# Patient Record
Sex: FEMALE | Race: WHITE | Employment: UNEMPLOYED | URBAN - METROPOLITAN AREA
[De-identification: names, ages, dates, MRNs, and addresses within clinical notes are randomized per-mention and may not be internally consistent; named-entity substitution may affect disease eponyms.]

---

## 2020-01-11 ENCOUNTER — OFFICE VISIT (OUTPATIENT)
Dept: FAMILY MEDICINE CLINIC | Age: 60
End: 2020-01-11

## 2020-01-11 VITALS
HEART RATE: 103 BPM | BODY MASS INDEX: 30.53 KG/M2 | SYSTOLIC BLOOD PRESSURE: 142 MMHG | RESPIRATION RATE: 18 BRPM | WEIGHT: 190 LBS | DIASTOLIC BLOOD PRESSURE: 92 MMHG | HEIGHT: 66 IN | OXYGEN SATURATION: 94 % | TEMPERATURE: 100.3 F

## 2020-01-11 PROCEDURE — 99202 OFFICE O/P NEW SF 15 MIN: CPT | Performed by: NURSE PRACTITIONER

## 2020-01-11 RX ORDER — PREDNISONE 10 MG/1
TABLET ORAL
Qty: 30 TABLET | Refills: 0 | Status: SHIPPED | OUTPATIENT
Start: 2020-01-11

## 2020-01-11 ASSESSMENT — ENCOUNTER SYMPTOMS
RESPIRATORY NEGATIVE: 1
WHEEZING: 0
CHEST TIGHTNESS: 0
COUGH: 0
SHORTNESS OF BREATH: 0

## 2020-01-11 NOTE — PROGRESS NOTES
Cardiovascular: Negative. Negative for chest pain. Musculoskeletal: Positive for myalgias. Skin: Positive for rash. Neurological: Negative for dizziness, weakness, light-headedness and headaches. Hematological: Negative for adenopathy. All other systems reviewed and are negative. Objective:      Physical Exam  Vitals signs and nursing note reviewed. Constitutional:       General: She is not in acute distress. Appearance: Normal appearance. She is well-developed. She is not ill-appearing, toxic-appearing or diaphoretic. HENT:      Head: Normocephalic. Right Ear: Tympanic membrane and external ear normal.      Left Ear: Tympanic membrane and external ear normal.      Nose: Nose normal.      Right Sinus: No maxillary sinus tenderness or frontal sinus tenderness. Left Sinus: No maxillary sinus tenderness or frontal sinus tenderness. Mouth/Throat:      Pharynx: No oropharyngeal exudate or posterior oropharyngeal erythema. Eyes:      General:         Right eye: No discharge. Left eye: No discharge. Cardiovascular:      Rate and Rhythm: Normal rate and regular rhythm. Heart sounds: Normal heart sounds. No murmur. Pulmonary:      Effort: Pulmonary effort is normal. No respiratory distress. Breath sounds: Normal breath sounds. No wheezing or rales. Lymphadenopathy:      Cervical: No cervical adenopathy. Skin:     General: Skin is warm. Findings: Rash present. Rash is urticarial.   Neurological:      Mental Status: She is alert. BP (!) 142/92   Pulse 103   Temp 100.3 °F (37.9 °C)   Resp 18   Ht 5' 6\" (1.676 m)   Wt 190 lb (86.2 kg)   SpO2 94%   BMI 30.67 kg/m²     Assessment:       Diagnosis Orders   1. Urticarial rash  predniSONE (DELTASONE) 10 MG tablet   2. Viral URI             Based on the reported symptoms and the appearance of the rash-- I believe this is urticarial at this time.   Prednisone sent to the pharmacy to help enable symptom relief. Once daily Claritin recommended. Benadryl as needed for the itching/histamine response. Patient agrees with treatment plan. Educational materials provided on AVS.  Follow up if symptoms do not improve/worsen. Orders Placed This Encounter   Medications    predniSONE (DELTASONE) 10 MG tablet     Sig: Take 4 tabs daily  x 3 days, 3 tabs daily x 3 days, 2 tabs daily x 3 days, and 1 tab daily x 3 days. Dispense:  30 tablet     Refill:  0        Patient given educational materials - see patient instructions. Discussed use, benefit, and side effects of prescribed medications. All patientquestions answered. Pt voiced understanding.     Electronically signed by SHELLY Macias CNP on 1/15/2020at 8:51 AM

## 2020-01-11 NOTE — PATIENT INSTRUCTIONS
lightheaded or suddenly feel weak, confused, or restless.    Call your doctor now or seek immediate medical care if:    · You have symptoms of an allergic reaction, such as:  ? A rash or hives (raised, red areas on the skin). ? Itching. ? Swelling. ? Belly pain, nausea, or vomiting.     · You get hives after you start a new medicine.     · Hives have not gone away after 24 hours.    Watch closely for changes in your health, and be sure to contact your doctor if:    · You do not get better as expected. Where can you learn more? Go to https://[email or web address].[email or web address]. org and sign in to your [facility] account. Enter Z935 in the [employer].com box to learn more about \"Hives: Care Instructions. \"     If you do not have an account, please click on the \"Sign Up Now\" link. Current as of: June 26, 2019  Content Version: 12.3  © 9873-7182 Healthwise, Incorporated. Care instructions adapted under license by [facility] ([facility]). If you have questions about a medical condition or this instruction, always ask your healthcare professional. [place] [address] any warranty or liability for your use of this information.

## 2020-01-15 ASSESSMENT — ENCOUNTER SYMPTOMS
VOICE CHANGE: 0
EYE DISCHARGE: 0
SORE THROAT: 0
EYE REDNESS: 0
SINUS PRESSURE: 0

## 2024-02-09 ENCOUNTER — APPOINTMENT (OUTPATIENT)
Dept: CT IMAGING | Age: 64
DRG: 442 | End: 2024-02-09
Payer: COMMERCIAL

## 2024-02-09 ENCOUNTER — HOSPITAL ENCOUNTER (INPATIENT)
Age: 64
LOS: 4 days | Discharge: HOME HEALTH | DRG: 442 | End: 2024-02-13
Attending: EMERGENCY MEDICINE | Admitting: INTERNAL MEDICINE
Payer: COMMERCIAL

## 2024-02-09 ENCOUNTER — APPOINTMENT (OUTPATIENT)
Dept: GENERAL RADIOLOGY | Age: 64
DRG: 442 | End: 2024-02-09
Payer: COMMERCIAL

## 2024-02-09 DIAGNOSIS — J90 PLEURAL EFFUSION: ICD-10-CM

## 2024-02-09 DIAGNOSIS — K76.9 LESION OF LIVER GREATER THAN 1 CM IN DIAMETER WITH NO LIVER DISEASE OR HISTORY OF MALIGNANT NEOPLASM: Primary | ICD-10-CM

## 2024-02-09 DIAGNOSIS — K75.0 LIVER ABSCESS: ICD-10-CM

## 2024-02-09 DIAGNOSIS — R59.0 HILAR LYMPHADENOPATHY: ICD-10-CM

## 2024-02-09 PROBLEM — D72.9 NEUTROPHILIA: Status: ACTIVE | Noted: 2024-02-09

## 2024-02-09 PROBLEM — D72.828 NEUTROPHILIA: Status: ACTIVE | Noted: 2024-02-09

## 2024-02-09 PROBLEM — R59.1 LYMPHADENOPATHY: Status: ACTIVE | Noted: 2024-02-09

## 2024-02-09 LAB
ALBUMIN SERPL-MCNC: 4.1 G/DL (ref 3.5–5.2)
ALP SERPL-CCNC: 119 U/L (ref 35–104)
ALT SERPL-CCNC: 16 U/L (ref 5–33)
ANION GAP SERPL CALCULATED.3IONS-SCNC: 13 MMOL/L (ref 9–17)
AST SERPL-CCNC: 16 U/L
BASOPHILS # BLD: 0.08 K/UL (ref 0–0.2)
BASOPHILS NFR BLD: 1 % (ref 0–2)
BILIRUB DIRECT SERPL-MCNC: 0.1 MG/DL
BILIRUB INDIRECT SERPL-MCNC: 0.3 MG/DL (ref 0–1)
BILIRUB SERPL-MCNC: 0.4 MG/DL (ref 0.3–1.2)
BUN SERPL-MCNC: 8 MG/DL (ref 8–23)
BUN/CREAT SERPL: 11 (ref 9–20)
CALCIUM SERPL-MCNC: 9.7 MG/DL (ref 8.6–10.4)
CHLORIDE SERPL-SCNC: 94 MMOL/L (ref 98–107)
CO2 SERPL-SCNC: 27 MMOL/L (ref 20–31)
CREAT SERPL-MCNC: 0.7 MG/DL (ref 0.5–0.9)
D DIMER PPP FEU-MCNC: 3.66 UG/ML FEU (ref 0–0.59)
EKG ATRIAL RATE: 85 BPM
EKG P AXIS: 41 DEGREES
EKG P-R INTERVAL: 120 MS
EKG Q-T INTERVAL: 368 MS
EKG QRS DURATION: 82 MS
EKG QTC CALCULATION (BAZETT): 437 MS
EKG R AXIS: 0 DEGREES
EKG T AXIS: 52 DEGREES
EKG VENTRICULAR RATE: 85 BPM
EOSINOPHIL # BLD: 0.05 K/UL (ref 0–0.44)
EOSINOPHILS RELATIVE PERCENT: 0 % (ref 1–4)
ERYTHROCYTE [DISTWIDTH] IN BLOOD BY AUTOMATED COUNT: 12 % (ref 11.8–14.4)
GFR SERPL CREATININE-BSD FRML MDRD: >60 ML/MIN/1.73M2
GLUCOSE SERPL-MCNC: 90 MG/DL (ref 70–99)
HCT VFR BLD AUTO: 41.7 % (ref 36.3–47.1)
HGB BLD-MCNC: 13.6 G/DL (ref 11.9–15.1)
IMM GRANULOCYTES # BLD AUTO: 0.04 K/UL (ref 0–0.3)
IMM GRANULOCYTES NFR BLD: 0 %
INR PPP: 1.2
LIPASE SERPL-CCNC: 31 U/L (ref 13–60)
LYMPHOCYTES NFR BLD: 1.37 K/UL (ref 1.1–3.7)
LYMPHOCYTES RELATIVE PERCENT: 10 % (ref 24–43)
MCH RBC QN AUTO: 30.9 PG (ref 25.2–33.5)
MCHC RBC AUTO-ENTMCNC: 32.6 G/DL (ref 28.4–34.8)
MCV RBC AUTO: 94.8 FL (ref 82.6–102.9)
MONOCYTES NFR BLD: 0.81 K/UL (ref 0.1–1.2)
MONOCYTES NFR BLD: 6 % (ref 3–12)
NEUTROPHILS NFR BLD: 83 % (ref 36–65)
NEUTS SEG NFR BLD: 12.08 K/UL (ref 1.5–8.1)
NRBC BLD-RTO: 0 PER 100 WBC
PLATELET # BLD AUTO: 527 K/UL (ref 138–453)
PMV BLD AUTO: 9.9 FL (ref 8.1–13.5)
POTASSIUM SERPL-SCNC: 4.4 MMOL/L (ref 3.7–5.3)
PROT SERPL-MCNC: 8.8 G/DL (ref 6.4–8.3)
PROTHROMBIN TIME: 14.8 SEC (ref 11.5–14.2)
RBC # BLD AUTO: 4.4 M/UL (ref 3.95–5.11)
SODIUM SERPL-SCNC: 134 MMOL/L (ref 135–144)
TROPONIN I SERPL HS-MCNC: 8 NG/L (ref 0–14)
WBC OTHER # BLD: 14.4 K/UL (ref 3.5–11.3)

## 2024-02-09 PROCEDURE — 1200000000 HC SEMI PRIVATE

## 2024-02-09 PROCEDURE — 96375 TX/PRO/DX INJ NEW DRUG ADDON: CPT

## 2024-02-09 PROCEDURE — 71260 CT THORAX DX C+: CPT

## 2024-02-09 PROCEDURE — 6360000004 HC RX CONTRAST MEDICATION

## 2024-02-09 PROCEDURE — 85610 PROTHROMBIN TIME: CPT

## 2024-02-09 PROCEDURE — 99255 IP/OBS CONSLTJ NEW/EST HI 80: CPT | Performed by: INTERNAL MEDICINE

## 2024-02-09 PROCEDURE — 84484 ASSAY OF TROPONIN QUANT: CPT

## 2024-02-09 PROCEDURE — 83690 ASSAY OF LIPASE: CPT

## 2024-02-09 PROCEDURE — 2580000003 HC RX 258

## 2024-02-09 PROCEDURE — 96374 THER/PROPH/DIAG INJ IV PUSH: CPT

## 2024-02-09 PROCEDURE — 80076 HEPATIC FUNCTION PANEL: CPT

## 2024-02-09 PROCEDURE — 93005 ELECTROCARDIOGRAM TRACING: CPT

## 2024-02-09 PROCEDURE — 2580000003 HC RX 258: Performed by: INTERNAL MEDICINE

## 2024-02-09 PROCEDURE — 99222 1ST HOSP IP/OBS MODERATE 55: CPT | Performed by: INTERNAL MEDICINE

## 2024-02-09 PROCEDURE — 6370000000 HC RX 637 (ALT 250 FOR IP): Performed by: NURSE PRACTITIONER

## 2024-02-09 PROCEDURE — 85025 COMPLETE CBC W/AUTO DIFF WBC: CPT

## 2024-02-09 PROCEDURE — 71101 X-RAY EXAM UNILAT RIBS/CHEST: CPT

## 2024-02-09 PROCEDURE — 99285 EMERGENCY DEPT VISIT HI MDM: CPT

## 2024-02-09 PROCEDURE — 96361 HYDRATE IV INFUSION ADD-ON: CPT

## 2024-02-09 PROCEDURE — 2580000003 HC RX 258: Performed by: NURSE PRACTITIONER

## 2024-02-09 PROCEDURE — 6360000002 HC RX W HCPCS: Performed by: INTERNAL MEDICINE

## 2024-02-09 PROCEDURE — 87040 BLOOD CULTURE FOR BACTERIA: CPT

## 2024-02-09 PROCEDURE — 73030 X-RAY EXAM OF SHOULDER: CPT

## 2024-02-09 PROCEDURE — 80048 BASIC METABOLIC PNL TOTAL CA: CPT

## 2024-02-09 PROCEDURE — 93010 ELECTROCARDIOGRAM REPORT: CPT | Performed by: INTERNAL MEDICINE

## 2024-02-09 PROCEDURE — 85379 FIBRIN DEGRADATION QUANT: CPT

## 2024-02-09 PROCEDURE — 6360000002 HC RX W HCPCS

## 2024-02-09 RX ORDER — MORPHINE SULFATE 4 MG/ML
4 INJECTION, SOLUTION INTRAMUSCULAR; INTRAVENOUS ONCE
Status: COMPLETED | OUTPATIENT
Start: 2024-02-09 | End: 2024-02-09

## 2024-02-09 RX ORDER — ONDANSETRON 4 MG/1
4 TABLET, ORALLY DISINTEGRATING ORAL EVERY 8 HOURS PRN
Status: DISCONTINUED | OUTPATIENT
Start: 2024-02-09 | End: 2024-02-13 | Stop reason: HOSPADM

## 2024-02-09 RX ORDER — ACETAMINOPHEN 325 MG/1
650 TABLET ORAL EVERY 6 HOURS PRN
Status: DISCONTINUED | OUTPATIENT
Start: 2024-02-09 | End: 2024-02-13 | Stop reason: HOSPADM

## 2024-02-09 RX ORDER — SODIUM CHLORIDE 0.9 % (FLUSH) 0.9 %
5-40 SYRINGE (ML) INJECTION EVERY 12 HOURS SCHEDULED
Status: DISCONTINUED | OUTPATIENT
Start: 2024-02-09 | End: 2024-02-13 | Stop reason: HOSPADM

## 2024-02-09 RX ORDER — SODIUM CHLORIDE 0.9 % (FLUSH) 0.9 %
10 SYRINGE (ML) INJECTION PRN
Status: DISCONTINUED | OUTPATIENT
Start: 2024-02-09 | End: 2024-02-13 | Stop reason: HOSPADM

## 2024-02-09 RX ORDER — MAGNESIUM SULFATE 1 G/100ML
1000 INJECTION INTRAVENOUS PRN
Status: DISCONTINUED | OUTPATIENT
Start: 2024-02-09 | End: 2024-02-13 | Stop reason: HOSPADM

## 2024-02-09 RX ORDER — 0.9 % SODIUM CHLORIDE 0.9 %
100 INTRAVENOUS SOLUTION INTRAVENOUS ONCE
Status: DISCONTINUED | OUTPATIENT
Start: 2024-02-09 | End: 2024-02-13 | Stop reason: HOSPADM

## 2024-02-09 RX ORDER — POLYETHYLENE GLYCOL 3350 17 G/17G
17 POWDER, FOR SOLUTION ORAL DAILY PRN
Status: DISCONTINUED | OUTPATIENT
Start: 2024-02-09 | End: 2024-02-13 | Stop reason: HOSPADM

## 2024-02-09 RX ORDER — SODIUM CHLORIDE 9 MG/ML
INJECTION, SOLUTION INTRAVENOUS PRN
Status: DISCONTINUED | OUTPATIENT
Start: 2024-02-09 | End: 2024-02-13 | Stop reason: HOSPADM

## 2024-02-09 RX ORDER — METRONIDAZOLE 500 MG/100ML
500 INJECTION, SOLUTION INTRAVENOUS EVERY 8 HOURS
Status: DISCONTINUED | OUTPATIENT
Start: 2024-02-09 | End: 2024-02-10

## 2024-02-09 RX ORDER — ONDANSETRON 2 MG/ML
4 INJECTION INTRAMUSCULAR; INTRAVENOUS EVERY 6 HOURS PRN
Status: DISCONTINUED | OUTPATIENT
Start: 2024-02-09 | End: 2024-02-13 | Stop reason: HOSPADM

## 2024-02-09 RX ORDER — 0.9 % SODIUM CHLORIDE 0.9 %
1000 INTRAVENOUS SOLUTION INTRAVENOUS ONCE
Status: COMPLETED | OUTPATIENT
Start: 2024-02-09 | End: 2024-02-09

## 2024-02-09 RX ORDER — ENOXAPARIN SODIUM 100 MG/ML
40 INJECTION SUBCUTANEOUS DAILY
Status: DISCONTINUED | OUTPATIENT
Start: 2024-02-10 | End: 2024-02-13 | Stop reason: HOSPADM

## 2024-02-09 RX ORDER — ACETAMINOPHEN 650 MG/1
650 SUPPOSITORY RECTAL EVERY 6 HOURS PRN
Status: DISCONTINUED | OUTPATIENT
Start: 2024-02-09 | End: 2024-02-13 | Stop reason: HOSPADM

## 2024-02-09 RX ORDER — POTASSIUM CHLORIDE 20 MEQ/1
40 TABLET, EXTENDED RELEASE ORAL PRN
Status: DISCONTINUED | OUTPATIENT
Start: 2024-02-09 | End: 2024-02-13 | Stop reason: HOSPADM

## 2024-02-09 RX ORDER — KETOROLAC TROMETHAMINE 15 MG/ML
15 INJECTION, SOLUTION INTRAMUSCULAR; INTRAVENOUS ONCE
Status: COMPLETED | OUTPATIENT
Start: 2024-02-09 | End: 2024-02-09

## 2024-02-09 RX ORDER — HYDROCODONE BITARTRATE AND ACETAMINOPHEN 5; 325 MG/1; MG/1
1 TABLET ORAL EVERY 6 HOURS PRN
Status: DISCONTINUED | OUTPATIENT
Start: 2024-02-09 | End: 2024-02-10

## 2024-02-09 RX ORDER — POTASSIUM CHLORIDE 7.45 MG/ML
10 INJECTION INTRAVENOUS PRN
Status: DISCONTINUED | OUTPATIENT
Start: 2024-02-09 | End: 2024-02-13 | Stop reason: HOSPADM

## 2024-02-09 RX ADMIN — SODIUM CHLORIDE 1000 ML: 9 INJECTION, SOLUTION INTRAVENOUS at 10:59

## 2024-02-09 RX ADMIN — PIPERACILLIN AND TAZOBACTAM 3375 MG: 3; .375 INJECTION, POWDER, FOR SOLUTION INTRAVENOUS at 21:26

## 2024-02-09 RX ADMIN — PIPERACILLIN AND TAZOBACTAM 4500 MG: 4; .5 INJECTION, POWDER, FOR SOLUTION INTRAVENOUS at 14:27

## 2024-02-09 RX ADMIN — METRONIDAZOLE 500 MG: 500 INJECTION, SOLUTION INTRAVENOUS at 20:01

## 2024-02-09 RX ADMIN — Medication 80 ML: at 11:45

## 2024-02-09 RX ADMIN — SODIUM CHLORIDE, PRESERVATIVE FREE 10 ML: 5 INJECTION INTRAVENOUS at 11:44

## 2024-02-09 RX ADMIN — HYDROCODONE BITARTRATE AND ACETAMINOPHEN 1 TABLET: 5; 325 TABLET ORAL at 21:29

## 2024-02-09 RX ADMIN — MORPHINE SULFATE 4 MG: 4 INJECTION, SOLUTION INTRAMUSCULAR; INTRAVENOUS at 14:23

## 2024-02-09 RX ADMIN — SODIUM CHLORIDE, PRESERVATIVE FREE 10 ML: 5 INJECTION INTRAVENOUS at 19:59

## 2024-02-09 RX ADMIN — IOPAMIDOL 75 ML: 755 INJECTION, SOLUTION INTRAVENOUS at 11:44

## 2024-02-09 RX ADMIN — KETOROLAC TROMETHAMINE 15 MG: 15 INJECTION, SOLUTION INTRAMUSCULAR; INTRAVENOUS at 11:00

## 2024-02-09 NOTE — CARE COORDINATION
Case Management Assessment  Initial Evaluation    Date/Time of Evaluation: 2/9/2024 3:56 PM  Assessment Completed by: HELENA Pardo    If patient is discharged prior to next notation, then this note serves as note for discharge by case management.    Patient Name: Amanda Raphael                   YOB: 1960  Diagnosis: Lesion of right lobe of liver [K76.9]                   Date / Time: 2/9/2024 10:11 AM    Patient Admission Status: Inpatient   Readmission Risk (Low < 19, Mod (19-27), High > 27): No data recorded  Current PCP: No primary care provider on file.  PCP verified by CM? No    Chart Reviewed: Yes      History Provided by: Patient  Patient Orientation: Alert and Oriented    Patient Cognition: Alert    Hospitalization in the last 30 days (Readmission):  No    If yes, Readmission Assessment in  Navigator will be completed.    Advance Directives:      Code Status: Not on file   Patient's Primary Decision Maker is: Legal Next of Kin      Discharge Planning:    Patient lives with: Alone Type of Home: House  Primary Care Giver: Self  Patient Support Systems include: Family Members   Current Financial resources: Other (Comment) (commercial)  Current community resources: None  Current services prior to admission: None            Current DME:              Type of Home Care services:  None    ADLS  Prior functional level: Independent in ADLs/IADLs  Current functional level: Independent in ADLs/IADLs    PT AM-PAC:   /24  OT AM-PAC:   /24    Family can provide assistance at DC: No  Would you like Case Management to discuss the discharge plan with any other family members/significant others, and if so, who? No  Plans to Return to Present Housing: Yes  Other Identified Issues/Barriers to RETURNING to current housing: liver lesion  Potential Assistance needed at discharge: N/A            Potential DME:    Patient expects to discharge to: House  Plan for transportation at discharge:

## 2024-02-09 NOTE — ED NOTES
ED to inpatient nurses report     Chief Complaint   Patient presents with    Rib Pain (injury)     Right side, denies any injury, cough for last 2 wks     Shoulder Injury     States she dislocated and relocated per self no injury       Present to ED from HOME  LOC: alert and orientated to name, place, date  Vital signs   Vitals:    02/09/24 1121 02/09/24 1129 02/09/24 1136 02/09/24 1315   BP:       Pulse: 85 84 84 89   Resp: 17 20 20 20   Temp:       SpO2: 94% 93% 93% 95%   Weight:       Height:          Oxygen Baseline NA    Current needs required NA   SEPSIS: NA    LDAs:   Peripheral IV 02/09/24 Left Antecubital (Active)     Mobility: Independent  Fall Risk:  NA  Pending ED orders: NA  Present condition: STABLE  Code Status: FULL  Consults: IP CONSULT TO ONCOLOGY  IP CONSULT TO HOSPITALIST  []  Hospitalist  Completed  [] yes [] no Who:   []  Medicine  Completed  [] yes [] No Who:   []  Cardiology  Completed  [] yes [] No Who:   []  GI   Completed  [] yes [] No Who:   []  Neurology  Completed  [] yes [] No Who:   []  Nephrology Completed  [] yes [] No Who:    []  Vascular  Completed  [] yes [] No Who:   []  Ortho  Completed  [] yes [] No Who:     []  Surgery  Completed  [] yes [] No Who:    []  Urology  Completed  [] yes [] No Who:    []  CT Surgery Completed  [] yes [] No Who:   []  Podiatry  Completed  [] yes [] No Who:    []  Other    Completed  [] yes [] No Who:  Interventions: Right chest pain, cough for 1.5-2 weeks.  Important Events: Hepatic lesion noted on imaging. Pt started on IV antibiotics.         Electronically signed by SHELLY Juarez CNP on 2/9/2024 at 2:05 PM

## 2024-02-09 NOTE — PROGRESS NOTES
Patient arrived to room from ED.   Patient made comfortable, call light within reach.   Oriented to the room. Bed mechanics in good repair,  Side rails up x2.

## 2024-02-09 NOTE — ED PROVIDER NOTES
eMERGENCY dEPARTMENT eNCOUnter   Independent Attestation     Pt Name: Amanda Raphael  MRN: [id number]  Birthdate 1960  Date of evaluation: 2/9/24     Amanda Raphael is a 63 y.o. female with CC: Rib Pain (injury) (Right side, denies any injury, cough for last 2 wks ) and Shoulder Injury (States she dislocated and relocated per self no injury )      Based on the medical record the care appears appropriate.  I was personally available for consultation in the Emergency Department.    Ubaldo Munoz DO  Attending Emergency Physician                  Ubaldo Munoz DO  02/09/24 5411    
lesion measuring up  to 7.3 cm which raises concern for the possibility of malignancy or  metastatic disease.  Infection is also a differential consideration depending  upon the clinical setting.  2. Small right-sided effusion.  Enlarged subcarinal and right hilar lymph  nodes/lymphadenopathy.  3. No clear evidence for central pulmonary embolus within the limitations of  this study.  4. Bibasilar atelectasis and respiratory motion. [AJ]   1400 Spoke with Dr. Hernández with oncology who will follow patient in the hospital. Will consult intermed for admission, broad spectrum antibiotics ordered for infection versus metastatic process. [AJ]   1416 I spoke with MYNOR Dacosta with [facility] who accepts the patient for admission. [AJ]      ED Course User Index  [AJ] Bárbara Em APRN - CNP       CONSULTS:  IP CONSULT TO ONCOLOGY  IP CONSULT TO HOSPITALIST        FINAL IMPRESSION      1. Lesion of liver greater than 1 cm in diameter with no liver disease or history of malignant neoplasm    2. Pleural effusion    3. Hilar lymphadenopathy            Problem List  There is no problem list on file for this patient.        DISPOSITION/PLAN   DISPOSITION Decision To Admit 02/09/2024 02:00:02 PM      PATIENT REFERRED TO:   No follow-up provider specified.    DISCHARGE MEDICATIONS:     New Prescriptions    No medications on file           (Please note that portions of this note were completed with a voice recognition program.  Efforts were made to edit the dictations but occasionally words are mis-transcribed.)    SHELLY Tellez CNP, Audrey, APRN - CNP  02/09/24 3865

## 2024-02-09 NOTE — H&P
104 U/L    ALT 16 5 - 33 U/L    AST 16 <32 U/L    Total Bilirubin 0.4 0.3 - 1.2 mg/dL    Bilirubin, Direct 0.1 <0.3 mg/dL    Bilirubin, Indirect 0.3 0.0 - 1.0 mg/dL    Total Protein 8.8 (H) 6.4 - 8.3 g/dL   Protime-INR    Collection Time: 02/09/24 10:52 AM   Result Value Ref Range    Protime 14.8 (H) 11.5 - 14.2 sec    INR 1.2    Lipase    Collection Time: 02/09/24 10:52 AM   Result Value Ref Range    Lipase 31 13 - 60 U/L   EKG 12 Lead    Collection Time: 02/09/24 10:54 AM   Result Value Ref Range    Ventricular Rate 85 BPM    Atrial Rate 85 BPM    P-R Interval 120 ms    QRS Duration 82 ms    Q-T Interval 368 ms    QTc Calculation (Bazett) 437 ms    P Axis 41 degrees    R Axis 0 degrees    T Axis 52 degrees   Blood Culture 1    Collection Time: 02/09/24  2:10 PM    Specimen: Blood   Result Value Ref Range    Specimen Description .BLOOD     Special Requests RAC 10ML     Culture NO GROWTH <24 HRS    Culture, Blood 2    Collection Time: 02/09/24  2:14 PM    Specimen: Blood   Result Value Ref Range    Specimen Description .BLOOD     Special Requests LAC 10ML     Culture NO GROWTH <24 HRS        Imaging/Diagnostics:  CT CHEST PULMONARY EMBOLISM W CONTRAST    Result Date: 2/9/2024  1. Large indeterminate poorly defined right hepatic lobe lesion measuring up to 7.3 cm which raises concern for the possibility of malignancy or metastatic disease.  Infection is also a differential consideration depending upon the clinical setting. 2. Small right-sided effusion.  Enlarged subcarinal and right hilar lymph nodes/lymphadenopathy. 3. No clear evidence for central pulmonary embolus within the limitations of this study. 4. Bibasilar atelectasis and respiratory motion.     XR RIBS RIGHT INCLUDE CHEST (MIN 3 VIEWS)    Result Date: 2/9/2024  Right shoulder: 1. Findings which can be seen with subacromial impingement.  Mild degenerative change of the right AC joint. 2. No acute fracture or dislocation. Chest/right-sided ribs: 1. Mild

## 2024-02-09 NOTE — ED NOTES
Patient is not currently connected to a PCP. Provided patient with a list and encouraged use of list to follow up and engage in routine health care visits.

## 2024-02-10 ENCOUNTER — APPOINTMENT (OUTPATIENT)
Dept: CT IMAGING | Age: 64
DRG: 442 | End: 2024-02-10
Payer: COMMERCIAL

## 2024-02-10 PROBLEM — K76.9 LESION OF RIGHT LOBE OF LIVER: Status: ACTIVE | Noted: 2024-02-10

## 2024-02-10 PROBLEM — J90 PLEURAL EFFUSION: Status: ACTIVE | Noted: 2024-02-10

## 2024-02-10 LAB
AFP SERPL-MCNC: <1.8 UG/L
ALBUMIN SERPL-MCNC: 3.6 G/DL (ref 3.5–5.2)
ALP SERPL-CCNC: 99 U/L (ref 35–104)
ALT SERPL-CCNC: 12 U/L (ref 5–33)
ANION GAP SERPL CALCULATED.3IONS-SCNC: 11 MMOL/L (ref 9–17)
AST SERPL-CCNC: 10 U/L
BASOPHILS # BLD: 0.06 K/UL (ref 0–0.2)
BASOPHILS NFR BLD: 1 % (ref 0–2)
BILIRUB SERPL-MCNC: 0.4 MG/DL (ref 0.3–1.2)
BUN SERPL-MCNC: 8 MG/DL (ref 8–23)
BUN/CREAT SERPL: 11 (ref 9–20)
CALCIUM SERPL-MCNC: 9.2 MG/DL (ref 8.6–10.4)
CHLORIDE SERPL-SCNC: 97 MMOL/L (ref 98–107)
CO2 SERPL-SCNC: 28 MMOL/L (ref 20–31)
CREAT SERPL-MCNC: 0.7 MG/DL (ref 0.5–0.9)
EOSINOPHIL # BLD: 0.08 K/UL (ref 0–0.44)
EOSINOPHILS RELATIVE PERCENT: 1 % (ref 1–4)
ERYTHROCYTE [DISTWIDTH] IN BLOOD BY AUTOMATED COUNT: 12.1 % (ref 11.8–14.4)
GFR SERPL CREATININE-BSD FRML MDRD: >60 ML/MIN/1.73M2
GLUCOSE SERPL-MCNC: 93 MG/DL (ref 70–99)
HCT VFR BLD AUTO: 37.6 % (ref 36.3–47.1)
HGB BLD-MCNC: 12.3 G/DL (ref 11.9–15.1)
IMM GRANULOCYTES # BLD AUTO: 0.04 K/UL (ref 0–0.3)
IMM GRANULOCYTES NFR BLD: 0 %
LYMPHOCYTES NFR BLD: 1.21 K/UL (ref 1.1–3.7)
LYMPHOCYTES RELATIVE PERCENT: 12 % (ref 24–43)
MCH RBC QN AUTO: 31.1 PG (ref 25.2–33.5)
MCHC RBC AUTO-ENTMCNC: 32.7 G/DL (ref 28.4–34.8)
MCV RBC AUTO: 94.9 FL (ref 82.6–102.9)
MONOCYTES NFR BLD: 0.79 K/UL (ref 0.1–1.2)
MONOCYTES NFR BLD: 8 % (ref 3–12)
NEUTROPHILS NFR BLD: 78 % (ref 36–65)
NEUTS SEG NFR BLD: 7.78 K/UL (ref 1.5–8.1)
NRBC BLD-RTO: 0 PER 100 WBC
PLATELET # BLD AUTO: 459 K/UL (ref 138–453)
PMV BLD AUTO: 9.8 FL (ref 8.1–13.5)
POTASSIUM SERPL-SCNC: 4.5 MMOL/L (ref 3.7–5.3)
PROT SERPL-MCNC: 7.8 G/DL (ref 6.4–8.3)
RBC # BLD AUTO: 3.96 M/UL (ref 3.95–5.11)
SODIUM SERPL-SCNC: 136 MMOL/L (ref 135–144)
WBC OTHER # BLD: 10 K/UL (ref 3.5–11.3)

## 2024-02-10 PROCEDURE — 2580000003 HC RX 258: Performed by: INTERNAL MEDICINE

## 2024-02-10 PROCEDURE — 6370000000 HC RX 637 (ALT 250 FOR IP): Performed by: INTERNAL MEDICINE

## 2024-02-10 PROCEDURE — 99232 SBSQ HOSP IP/OBS MODERATE 35: CPT | Performed by: INTERNAL MEDICINE

## 2024-02-10 PROCEDURE — 6360000004 HC RX CONTRAST MEDICATION: Performed by: INTERNAL MEDICINE

## 2024-02-10 PROCEDURE — 85025 COMPLETE CBC W/AUTO DIFF WBC: CPT

## 2024-02-10 PROCEDURE — 6360000002 HC RX W HCPCS: Performed by: INTERNAL MEDICINE

## 2024-02-10 PROCEDURE — 1200000000 HC SEMI PRIVATE

## 2024-02-10 PROCEDURE — 82105 ALPHA-FETOPROTEIN SERUM: CPT

## 2024-02-10 PROCEDURE — 6370000000 HC RX 637 (ALT 250 FOR IP): Performed by: NURSE PRACTITIONER

## 2024-02-10 PROCEDURE — 99223 1ST HOSP IP/OBS HIGH 75: CPT | Performed by: INTERNAL MEDICINE

## 2024-02-10 PROCEDURE — 74177 CT ABD & PELVIS W/CONTRAST: CPT

## 2024-02-10 PROCEDURE — 36415 COLL VENOUS BLD VENIPUNCTURE: CPT

## 2024-02-10 PROCEDURE — 2580000003 HC RX 258: Performed by: NURSE PRACTITIONER

## 2024-02-10 PROCEDURE — 80053 COMPREHEN METABOLIC PANEL: CPT

## 2024-02-10 RX ORDER — TRAMADOL HYDROCHLORIDE 50 MG/1
50 TABLET ORAL EVERY 6 HOURS PRN
Status: DISCONTINUED | OUTPATIENT
Start: 2024-02-10 | End: 2024-02-11

## 2024-02-10 RX ORDER — SODIUM CHLORIDE 0.9 % (FLUSH) 0.9 %
10 SYRINGE (ML) INJECTION ONCE
Status: DISCONTINUED | OUTPATIENT
Start: 2024-02-10 | End: 2024-02-13 | Stop reason: HOSPADM

## 2024-02-10 RX ORDER — 0.9 % SODIUM CHLORIDE 0.9 %
80 INTRAVENOUS SOLUTION INTRAVENOUS ONCE
Status: COMPLETED | OUTPATIENT
Start: 2024-02-10 | End: 2024-02-10

## 2024-02-10 RX ADMIN — SODIUM CHLORIDE, PRESERVATIVE FREE 10 ML: 5 INJECTION INTRAVENOUS at 12:01

## 2024-02-10 RX ADMIN — PIPERACILLIN AND TAZOBACTAM 3375 MG: 3; .375 INJECTION, POWDER, FOR SOLUTION INTRAVENOUS at 15:18

## 2024-02-10 RX ADMIN — TRAMADOL HYDROCHLORIDE 50 MG: 50 TABLET, COATED ORAL at 20:03

## 2024-02-10 RX ADMIN — METRONIDAZOLE 500 MG: 500 INJECTION, SOLUTION INTRAVENOUS at 02:34

## 2024-02-10 RX ADMIN — PIPERACILLIN AND TAZOBACTAM 3375 MG: 3; .375 INJECTION, POWDER, FOR SOLUTION INTRAVENOUS at 04:20

## 2024-02-10 RX ADMIN — PIPERACILLIN AND TAZOBACTAM 3375 MG: 3; .375 INJECTION, POWDER, FOR SOLUTION INTRAVENOUS at 23:29

## 2024-02-10 RX ADMIN — METRONIDAZOLE 500 MG: 500 INJECTION, SOLUTION INTRAVENOUS at 13:39

## 2024-02-10 RX ADMIN — SODIUM CHLORIDE 80 ML: 9 INJECTION, SOLUTION INTRAVENOUS at 12:01

## 2024-02-10 RX ADMIN — SODIUM CHLORIDE, PRESERVATIVE FREE 10 ML: 5 INJECTION INTRAVENOUS at 09:00

## 2024-02-10 RX ADMIN — HYDROCODONE BITARTRATE AND ACETAMINOPHEN 1 TABLET: 5; 325 TABLET ORAL at 08:53

## 2024-02-10 RX ADMIN — IOPAMIDOL 75 ML: 755 INJECTION, SOLUTION INTRAVENOUS at 12:01

## 2024-02-10 NOTE — PROGRESS NOTES
[facility]  Office: [phone]  Luis Daniel Maagna DO, Ben Watson DO, Rajan Phillips DO, Pedro Damon DO, Bhavin Rajput MD, Denia Villalba MD, Alison Sarmiento MD, Renata Abel MD,  Jim Campbell MD, Sinai Bowers MD, Samanta Vee MD,  Maurice Tovar DO, Sheba Byrd MD, Miguel Rankin MD, Terence Magana DO, Bryanna Perez MD,  Salbador Raines DO, Crystal Krishna MD, Halina Delgado MD, Kelley Harding MD, Sulema Moore MD,  Juan Jose Rivers MD, Hernandez Navarro MD, Amarilys Amin MD, Gene Romero MD, Temo Khalil MD, Viktor Kelly MD, Soy Jim DO, Juan Nazario DO, Makayla Arzate MD,  Romain Gee MD, Shirley Waterhouse, CNP,  Giselle Rockwell CNP, Brendan Christopher, CNP,  Xiomara Styles, LEVI, Yuliana Holland, CNP, Ginette Boucher, CNP, Olesya Guerra CNP, Johnna Alegre CNP, Miriam King, CNP, Verona Nieto, PA-C, Maria R Nixon, PA-C, July Munoz, CNP, Brandy Jamil, CNP, Britta Wynne, CNS, Virginia Mae, CNP, Malinda Ling CNP, Tracy Schwab, CNP         [facility]   IN-PATIENT SERVICE   [facility]    Progress Note    2/10/2024    9:20 AM    Name:   Amanda Raphael  MRN:     [id number]     Acct:      [id number]   Room:   2005/2005-01  IP Day:  1  Admit Date:  2/9/2024 10:11 AM    PCP:   No primary care provider on file.  Code Status:  Full Code    Subjective:     C/C:   Chief Complaint   Patient presents with    Rib Pain (injury)     Right side, denies any injury, cough for last 2 wks     Shoulder Injury     States she dislocated and relocated per self no injury      Interval History Status: not changed.     Having ongoing ruq pain  Also had chills and sweats as well as low grade fever    Brief History:     This 63 yof presents with viral type symptoms starting a little more than a week ago: fevers 100-103 max, chills, myalgias, sweats, especially night sweats, headache. She seemed to get better then developed recurrent chills and night sweats and then

## 2024-02-11 PROCEDURE — 99232 SBSQ HOSP IP/OBS MODERATE 35: CPT | Performed by: INTERNAL MEDICINE

## 2024-02-11 PROCEDURE — 6370000000 HC RX 637 (ALT 250 FOR IP): Performed by: NURSE PRACTITIONER

## 2024-02-11 PROCEDURE — 2580000003 HC RX 258: Performed by: INTERNAL MEDICINE

## 2024-02-11 PROCEDURE — 1200000000 HC SEMI PRIVATE

## 2024-02-11 PROCEDURE — 6360000002 HC RX W HCPCS: Performed by: INTERNAL MEDICINE

## 2024-02-11 PROCEDURE — 6370000000 HC RX 637 (ALT 250 FOR IP): Performed by: INTERNAL MEDICINE

## 2024-02-11 RX ORDER — MORPHINE SULFATE 2 MG/ML
2 INJECTION, SOLUTION INTRAMUSCULAR; INTRAVENOUS ONCE
Status: COMPLETED | OUTPATIENT
Start: 2024-02-11 | End: 2024-02-11

## 2024-02-11 RX ORDER — HYDROCODONE BITARTRATE AND ACETAMINOPHEN 5; 325 MG/1; MG/1
1 TABLET ORAL EVERY 6 HOURS PRN
Status: DISCONTINUED | OUTPATIENT
Start: 2024-02-11 | End: 2024-02-13 | Stop reason: HOSPADM

## 2024-02-11 RX ORDER — LIDOCAINE 4 G/G
1 PATCH TOPICAL DAILY
Status: DISCONTINUED | OUTPATIENT
Start: 2024-02-11 | End: 2024-02-13 | Stop reason: HOSPADM

## 2024-02-11 RX ORDER — HYDROCODONE BITARTRATE AND ACETAMINOPHEN 5; 325 MG/1; MG/1
1.5 TABLET ORAL EVERY 6 HOURS PRN
Status: DISCONTINUED | OUTPATIENT
Start: 2024-02-11 | End: 2024-02-13 | Stop reason: HOSPADM

## 2024-02-11 RX ORDER — MORPHINE SULFATE 2 MG/ML
2 INJECTION, SOLUTION INTRAMUSCULAR; INTRAVENOUS
Status: DISCONTINUED | OUTPATIENT
Start: 2024-02-11 | End: 2024-02-13 | Stop reason: HOSPADM

## 2024-02-11 RX ADMIN — PIPERACILLIN AND TAZOBACTAM 3375 MG: 3; .375 INJECTION, POWDER, FOR SOLUTION INTRAVENOUS at 08:58

## 2024-02-11 RX ADMIN — PIPERACILLIN AND TAZOBACTAM 3375 MG: 3; .375 INJECTION, POWDER, FOR SOLUTION INTRAVENOUS at 23:11

## 2024-02-11 RX ADMIN — HYDROCODONE BITARTRATE AND ACETAMINOPHEN 1.5 TABLET: 5; 325 TABLET ORAL at 18:37

## 2024-02-11 RX ADMIN — MORPHINE SULFATE 2 MG: 2 INJECTION, SOLUTION INTRAMUSCULAR; INTRAVENOUS at 23:09

## 2024-02-11 RX ADMIN — MORPHINE SULFATE 2 MG: 2 INJECTION, SOLUTION INTRAMUSCULAR; INTRAVENOUS at 17:40

## 2024-02-11 RX ADMIN — PIPERACILLIN AND TAZOBACTAM 3375 MG: 3; .375 INJECTION, POWDER, FOR SOLUTION INTRAVENOUS at 13:59

## 2024-02-11 RX ADMIN — TRAMADOL HYDROCHLORIDE 50 MG: 50 TABLET, COATED ORAL at 02:12

## 2024-02-11 RX ADMIN — MORPHINE SULFATE 2 MG: 2 INJECTION, SOLUTION INTRAMUSCULAR; INTRAVENOUS at 13:56

## 2024-02-11 RX ADMIN — HYDROCODONE BITARTRATE AND ACETAMINOPHEN 1 TABLET: 5; 325 TABLET ORAL at 11:53

## 2024-02-11 RX ADMIN — MORPHINE SULFATE 2 MG: 2 INJECTION, SOLUTION INTRAMUSCULAR; INTRAVENOUS at 09:10

## 2024-02-11 NOTE — PROGRESS NOTES
[facility]  Office: [phone]  Luis Daniel Magana DO, Ben Watson DO, Rajan Phillips DO, Pedro Damon DO, Bhavin Rajput MD, Denia Villalba MD, Alison Sarmiento MD, Renata Abel MD,  Jim Campbell MD, Sinai Bowers MD, Samatna Vee MD,  Maurice Tovar DO, Sheba Byrd MD, Miguel Rankin MD, Terence Magana DO, Bryanna Perez MD,  Salbador Raines DO, Crystal Krishna MD, Halina Delgado MD, Kelley Harding MD, Sulema Moore MD,  Juan Jose Rivers MD, Hernandez Navarro MD, Amarilys Amin MD, Gene Romero MD, Temo Khalil MD, Viktor Kelly MD, Soy Jim DO, Juan Nazario DO, Makayla Arzate MD,  Romain Gee MD, Shirley Waterhouse, CNP,  Giselle Rockwell CNP, Brendan Christopher, CNP,  Xiomara Styles, LEVI, Yuliana Holland, CNP, Ginette Boucher, CNP, Olesya Guerra CNP, Johnna Alegre CNP, Miriam King, CNP, Verona Nieto, PA-C, Maria R Nixon, PA-C, July Munoz, CNP, Brandy Jamil, CNP, Britta Wynne, CNS, Virginia Mae, CNP, Malinda Ling CNP, Tracy Schwab, CNP         [facility]   IN-PATIENT SERVICE   [facility]    Progress Note    2/10/2024    9:20 AM    Name:   Amanda Raphael  MRN:     [id number]     Acct:      [id number]   Room:   2005/2005-01  IP Day:  1  Admit Date:  2/9/2024 10:11 AM    PCP:   No primary care provider on file.  Code Status:  Full Code    Subjective:     C/C:   Chief Complaint   Patient presents with    Rib Pain (injury)     Right side, denies any injury, cough for last 2 wks     Shoulder Injury     States she dislocated and relocated per self no injury      Interval History Status: worsened.     Having ongoing ruq pain: much worse today  Also had  low grade fever    Brief History:     This 63 yof presents with viral type symptoms starting a little more than a week ago: fevers 100-103 max, chills, myalgias, sweats, especially night sweats, headache. She seemed to get better then developed recurrent chills and night sweats and then she

## 2024-02-11 NOTE — CONSULTS
nondistended.  Extremities: No cyanosis, clubbing, edema, or effusions.  Neurologic: No gross sensory or motor deficits.    Skin: Warm and dry with no rash.    Medical Decision Making:   I have independently reviewed/ordered the following labs:  CBC with Differential:   Recent Labs     02/09/24  1052 02/10/24  0833   WBC 14.4* 10.0   HGB 13.6 12.3   HCT 41.7 37.6   * 459*   LYMPHOPCT 10* 12*   MONOPCT 6 8     BMP:   Recent Labs     02/09/24  1052 02/10/24  0833   * 136   K 4.4 4.5   CL 94* 97*   CO2 27 28   BUN 8 8   CREATININE 0.7 0.7     Hepatic Function Panel:   Recent Labs     02/09/24  1052 02/10/24  0833   PROT 8.8* 7.8   LABALBU 4.1 3.6   BILIDIR 0.1  --    IBILI 0.3  --    BILITOT 0.4 0.4   ALKPHOS 119* 99   ALT 16 12   AST 16 10       No results found for: \"PROCAL\"    No results found for: \"CRP\"  No results found for: \"FERRITIN\"  No results found for: \"FIBRINOGEN\"  Lab Results   Component Value Date/Time    DDIMER 3.66 02/09/2024 10:52 AM     No results found for: \"LDH\"    No results found for: \"SEDRATE\"    No results found for: \"COVID19\"  No results found for requested labs within last 30 days.       Imaging Studies:   CTA OF THE CHEST 2/9/2024 11:45 am   FINDINGS:   1. Large indeterminate poorly defined right hepatic lobe lesion measuring up to 7.3 cm which raises concern for the possibility of malignancy or metastatic disease.  Infection is also a differential consideration depending upon the clinical setting.   2. Small right-sided effusion.  Enlarged subcarinal and right hilar lymph nodes/lymphadenopathy.   3. No clear evidence for central pulmonary embolus within the limitations of this study.   4. Bibasilar atelectasis and respiratory motion.       TWO XRAY VIEWS OF THE RIGHT SHOULDER; 5 XRAY VIEWS OF THE RIGHT RIBS WITH FRONTAL XRAY VIEW OF THE CHEST 2/9/2024 10:27 am   FINDINGS:  Right shoulder:   1. Findings which can be seen with subacromial impingement.  Mild degenerative change of 
  ALT 16 12   LABALBU 4.1 3.6       CT ABDOMEN PELVIS W IV CONTRAST Additional Contrast? Radiologist Recommendation  Narrative: EXAMINATION:  CT OF THE ABDOMEN AND PELVIS WITH CONTRAST    2/10/2024 12:01 pm    TECHNIQUE:  CT of the abdomen and pelvis was performed with the administration of  intravenous contrast. Multiplanar reformatted images are provided for review.  Automated exposure control, iterative reconstruction, and/or weight based  adjustment of the mA/kV was utilized to reduce the radiation dose to as low  as reasonably achievable.    COMPARISON:  Chest CTA 02/09/2024    HISTORY:  ORDERING SYSTEM PROVIDED HISTORY: liver lesion, possible abscess vs tumor  TECHNOLOGIST PROVIDED HISTORY:    liver lesion, possible abscess vs tumor  Reason for Exam: RUQ pain, nausea, liver lesion    FINDINGS:  LOWER CHEST:  Slightly increased trace to small right pleural effusion with  associated passive atelectasis in the right lower lobe.  Increased linear  opacities in each lung base due to subsegmental atelectasis.  Gravity  dependent atelectasis in the left lower lobe.  Mild cardiomegaly.  No  pericardial nor left pleural effusions.    LIVER:  Mildly enlarged.  Heterogeneous arterial phase enhancement with  relative hyperenhancement in subcapsular locations and more prominent  relative hyperenhancement in segments 7 and 6, largely normalizing by the  portal venous phase.  5.2 cm x 5.6 cm x 4.6 cm loculated fluid collection in  segment 7 with fluid slightly denser than that of simple fluid, an  irregularly thickened wall with enhancement, and surrounding edema.  A few  hypodense lesions measuring up to 0.6 cm.  No findings suggestive of  cirrhosis.    ORGANS:  Mild splenomegaly.  Cortical scarring in the upper pole of the right  kidney.  Punctate nonobstructing calculus in an upper pole calyx of the right  renal collecting system.  2.7 cm partially endophytic simple cyst in the  medial interpolar left kidney (Bosniak 
CHEST    2/9/2024 10:27 am    COMPARISON:  None.    HISTORY:  ORDERING SYSTEM PROVIDED HISTORY: right shoulder pain  TECHNOLOGIST PROVIDED HISTORY:  right shoulder pain  Reason for Exam: rib pain and cough no injury    63-year-old female with right shoulder, right rib pain and coughing; no known  injury    FINDINGS:  Right shoulder:    Mild degenerative change of the right AC joint.  Inferolateral downsloping  acromion.  Irregularity of the superolateral humeral head.  Findings can be  seen with subacromial impingement.  Visualized right-sided ribs appear  intact.  No acute fracture or dislocation.    Chest/right-sided ribs:    Cardiac monitor leads overlie the chest.  Atherosclerotic calcification of  the aorta.  Trachea midline.  No pneumothorax.  No acute focal airspace  consolidation.  Mild bibasilar atelectasis and trace right-sided effusion.  Cardiac size within normal limits.  No acute osseous abnormality.  No acute  displaced right-sided rib fracture deformity.  Impression: Right shoulder:    1. Findings which can be seen with subacromial impingement.  Mild  degenerative change of the right AC joint.  2. No acute fracture or dislocation.  Chest/right-sided ribs:    1. Mild bibasilar atelectasis and trace right-sided effusion.  2. No acute displaced right-sided rib fracture deformity.            IMPRESSION:    Primary Problem  Lesion of right lobe of liver    Active Hospital Problems    Diagnosis Date Noted    Lesion of right lobe of liver [K76.9] 02/09/2024   Large poorly defined right hepatic lobe lesion.  Malignancy versus infection  Subcarinal and right hilar lymphadenopathy.  Leukocytosis    RECOMMENDATIONS:  Records and labs and images were reviewed and discussed with the patient.  Patient presents with large right hepatic lobe lesion.  Malignancy versus infectious etiology.  Subcarinal and right hilar lymphadenopathy.  Concern for underlying malignancy versus infection.  Explained the findings to the

## 2024-02-12 ENCOUNTER — APPOINTMENT (OUTPATIENT)
Dept: INTERVENTIONAL RADIOLOGY/VASCULAR | Age: 64
DRG: 442 | End: 2024-02-12
Payer: COMMERCIAL

## 2024-02-12 PROBLEM — K76.9: Status: ACTIVE | Noted: 2024-02-12

## 2024-02-12 PROCEDURE — 1200000000 HC SEMI PRIVATE

## 2024-02-12 PROCEDURE — 99232 SBSQ HOSP IP/OBS MODERATE 35: CPT | Performed by: INTERNAL MEDICINE

## 2024-02-12 PROCEDURE — 6370000000 HC RX 637 (ALT 250 FOR IP): Performed by: INTERNAL MEDICINE

## 2024-02-12 PROCEDURE — 6360000002 HC RX W HCPCS: Performed by: INTERNAL MEDICINE

## 2024-02-12 PROCEDURE — 2580000003 HC RX 258: Performed by: INTERNAL MEDICINE

## 2024-02-12 PROCEDURE — 2580000003 HC RX 258

## 2024-02-12 PROCEDURE — 99232 SBSQ HOSP IP/OBS MODERATE 35: CPT | Performed by: NURSE PRACTITIONER

## 2024-02-12 PROCEDURE — 76705 ECHO EXAM OF ABDOMEN: CPT

## 2024-02-12 RX ADMIN — SODIUM CHLORIDE, PRESERVATIVE FREE 10 ML: 5 INJECTION INTRAVENOUS at 06:10

## 2024-02-12 RX ADMIN — HYDROCODONE BITARTRATE AND ACETAMINOPHEN 1.5 TABLET: 5; 325 TABLET ORAL at 15:04

## 2024-02-12 RX ADMIN — PIPERACILLIN AND TAZOBACTAM 3375 MG: 3; .375 INJECTION, POWDER, FOR SOLUTION INTRAVENOUS at 06:14

## 2024-02-12 RX ADMIN — PIPERACILLIN AND TAZOBACTAM 3375 MG: 3; .375 INJECTION, POWDER, FOR SOLUTION INTRAVENOUS at 23:46

## 2024-02-12 RX ADMIN — PIPERACILLIN AND TAZOBACTAM 3375 MG: 3; .375 INJECTION, POWDER, FOR SOLUTION INTRAVENOUS at 15:08

## 2024-02-12 RX ADMIN — MORPHINE SULFATE 2 MG: 2 INJECTION, SOLUTION INTRAMUSCULAR; INTRAVENOUS at 13:18

## 2024-02-12 RX ADMIN — HYDROCODONE BITARTRATE AND ACETAMINOPHEN 1.5 TABLET: 5; 325 TABLET ORAL at 01:26

## 2024-02-12 RX ADMIN — MORPHINE SULFATE 2 MG: 2 INJECTION, SOLUTION INTRAMUSCULAR; INTRAVENOUS at 06:10

## 2024-02-12 RX ADMIN — HYDROCODONE BITARTRATE AND ACETAMINOPHEN 1.5 TABLET: 5; 325 TABLET ORAL at 21:20

## 2024-02-12 RX ADMIN — HYDROCODONE BITARTRATE AND ACETAMINOPHEN 1.5 TABLET: 5; 325 TABLET ORAL at 08:32

## 2024-02-12 NOTE — PROGRESS NOTES
[facility]  Office: [phone]  Luis Daniel Magana DO, Ben Watson DO, Rajan Phillips DO, Pedro Damon DO, Bhavin Rajput MD, Denia Villalba MD, Alison Sarmiento MD, Renata Abel MD,  Jim Campbell MD, Sinai Bowers MD, Samanta Vee MD,  Maurice Tovar DO, Sheba Byrd MD, Miguel Rankin MD, Terence Magana DO, Bryanna Perez MD,  Salbador Raines DO, Crystal Krishna MD, Halina Delgado MD, Kelley Harding MD, Sulema Moore MD,  Juan Jose Rivers MD, Hernandez Navarro MD, Amarilys Amin MD, Gene Romero MD, Temo Khalil MD, Viktor Kelly MD, Soy Jim DO, Juan Nazario DO, Makayla Arzate MD,  Romain Gee MD, Shirley Waterhouse, CNP,  Giselle Rockwell CNP, Brendan Christopher, CNP,  Xiomara Styles, LEVI, Yuliana Holland, CNP, Ginette Boucher, CNP, Olesya Guerra CNP, Johnna Alegre CNP, Miriam King, CNP, Verona Nieto, PA-C, Maria R Nixon, PA-C, July Munoz, CNP, Brandy Jamil, CNP, Britta Wynne, CNS, Virginia Mae, CNP, Malinda Ling CNP, Tracy Schwab, CNP         [facility]   IN-PATIENT SERVICE   [facility]    Progress Note    2/12/2024    11:47 AM    Name:   Amanda Raphael  MRN:     [id number]     Acct:      [id number]   Room:   2005/2005-01  IP Day:  3  Admit Date:  2/9/2024 10:11 AM    PCP:   No primary care provider on file.  Code Status:  Full Code    Subjective:     C/C:   Chief Complaint   Patient presents with    Rib Pain (injury)     Right side, denies any injury, cough for last 2 wks     Shoulder Injury     States she dislocated and relocated per self no injury      Interval History Status: worsened.     Having ongoing ruq pain: much worse today  Also had  low grade fever    Brief History:     This 63 yof presents with viral type symptoms starting a little more than a week ago: fevers 100-103 max, chills, myalgias, sweats, especially night sweats, headache. She seemed to get better then developed recurrent chills and night sweats and then she

## 2024-02-12 NOTE — BRIEF OP NOTE
Brief Postoperative Note    Amanda Raphael  YOB: 1960  [id number]    Pre-operative Diagnosis: Probable liver abscess    Post-operative Diagnosis: Same    Procedure: US assessment for possible aspiration    Medications Given: none    Anesthesia: Local    Surgeons/Assistants: MELINDA VASQUEZ MD    Estimated Blood Loss: minimal    Complications: none    Specimens: were not obtained    Findings: US assessment liver shows sl interval decrease in size (5.0 vs 5.5 cm AP) and less central fluid. Discussed with pt and Dr Damon. Aspiration or biopsy technically challenging. Suggest 5=6 days additional antibiotics, and then follow-up imaging; if improved, will continue to follow. If not will bx under CT.      Electronically signed by MELINDA VASQUEZ MD on 2/12/2024 at 4:39 PM

## 2024-02-12 NOTE — FLOWSHEET NOTE
IR procedure not done r/t decreasing size of area to be drained IR physician to notify Dr Damon that procedure not done.

## 2024-02-12 NOTE — PROGRESS NOTES
[facility]  Progress Note    Amanda Raphael  MRN: [id number]  Date: 2/12/2024  LOS: 3     Reason for F/U :   Right hepatic lobe lesion    Impression :     Right hepatic lobe lesion  Recent fevers/diaphoresis  Reported dental infections    Recommendations:   The patient continues on IV antimicrobial therapy with Zosyn  There were plans for interventional radiology to drain/aspirate the liver lesion; however, interventional radiology has suggested to repeat CT imaging in 4 to 5 days and if the lesion has not significantly reduced at that time, then to do a CT-guided biopsy at that time  Continue supportive care    Infection Control Recommendations:   Universal precautions    Discharge Planning:   Estimated Length of IV antimicrobials: To be determined  Patient will need Midline Catheter Insertion/ PICC line Insertion: No  Patient will need: Home IV , Infusion Center,  SNF,  LTAC: Undetermined  Patient willneed outpatient wound care: No    Medical Decision making / Summary of Stay:   Amanda Raphael is a 63 y.o.-year-old female who was initially admitted on 2/9/2024.   Amanda does not report any significant past medical history other than some \"dental infections\".  The patient reports that she has had issues with her teeth increasingly over the last 6 months with bleeding gums and dental infections.  She typically never gets sick and even if she does she reports that she has like a 24-hour bug and is better.  She reports that for about 10 to 14 days now she has had fevers, chills, sweats especially at night.  She reports that the symptoms seem to be okay during the day but at night to the will get worse and the symptoms have been going on and off.  She reports a mild headache.  She reports that for the past 7 days she then subsequently developed right lower rib pain that was progressively getting worse to the point that she could not handle the pain at all yesterday.  She did not report any nausea,

## 2024-02-12 NOTE — CARE COORDINATION
DC Planning    Spoke with pt at  bedside, ID on case. Discussed possible home IV ABX if warranted. Discussed HHC vs OP/Infusion Center. Pt is teachable-cooperative. Pt is from home alone-has 3 cats, drove self here-car is still here in parking lot. Independent.     Pt does not have a pcp-did not fell needed-considering going to [facility].     Will need ID to follow for IV ABX if need for home/OP.     Ref sent to [facility].    Heterosexual

## 2024-02-13 VITALS
HEIGHT: 66 IN | WEIGHT: 192 LBS | OXYGEN SATURATION: 92 % | RESPIRATION RATE: 18 BRPM | BODY MASS INDEX: 30.86 KG/M2 | TEMPERATURE: 97.3 F | HEART RATE: 85 BPM | SYSTOLIC BLOOD PRESSURE: 169 MMHG | DIASTOLIC BLOOD PRESSURE: 95 MMHG

## 2024-02-13 PROCEDURE — 2580000003 HC RX 258: Performed by: INTERNAL MEDICINE

## 2024-02-13 PROCEDURE — 99232 SBSQ HOSP IP/OBS MODERATE 35: CPT | Performed by: INTERNAL MEDICINE

## 2024-02-13 PROCEDURE — 99233 SBSQ HOSP IP/OBS HIGH 50: CPT | Performed by: INTERNAL MEDICINE

## 2024-02-13 PROCEDURE — 6370000000 HC RX 637 (ALT 250 FOR IP): Performed by: INTERNAL MEDICINE

## 2024-02-13 PROCEDURE — 2580000003 HC RX 258: Performed by: NURSE PRACTITIONER

## 2024-02-13 PROCEDURE — 6360000002 HC RX W HCPCS: Performed by: INTERNAL MEDICINE

## 2024-02-13 PROCEDURE — 02HV33Z INSERTION OF INFUSION DEVICE INTO SUPERIOR VENA CAVA, PERCUTANEOUS APPROACH: ICD-10-PCS | Performed by: INTERNAL MEDICINE

## 2024-02-13 RX ORDER — LIDOCAINE 4 G/G
1 PATCH TOPICAL DAILY
Qty: 30 PATCH | Refills: 1 | Status: SHIPPED | OUTPATIENT
Start: 2024-02-14

## 2024-02-13 RX ORDER — M-VIT,TX,IRON,MINS/CALC/FOLIC 27MG-0.4MG
1 TABLET ORAL DAILY
COMMUNITY

## 2024-02-13 RX ORDER — METOPROLOL TARTRATE 1 MG/ML
5 INJECTION, SOLUTION INTRAVENOUS EVERY 6 HOURS PRN
Status: DISCONTINUED | OUTPATIENT
Start: 2024-02-13 | End: 2024-02-13 | Stop reason: HOSPADM

## 2024-02-13 RX ORDER — METRONIDAZOLE 500 MG/1
500 TABLET ORAL EVERY 8 HOURS SCHEDULED
Status: DISCONTINUED | OUTPATIENT
Start: 2024-02-13 | End: 2024-02-13 | Stop reason: HOSPADM

## 2024-02-13 RX ORDER — HYDROCODONE BITARTRATE AND ACETAMINOPHEN 5; 325 MG/1; MG/1
1 TABLET ORAL EVERY 6 HOURS PRN
Qty: 12 TABLET | Refills: 0 | Status: SHIPPED | OUTPATIENT
Start: 2024-02-13 | End: 2024-02-16

## 2024-02-13 RX ORDER — GLUTATHIONE 100 %
POWDER (GRAM) MISCELLANEOUS 2 TIMES DAILY
COMMUNITY

## 2024-02-13 RX ORDER — METRONIDAZOLE 500 MG/1
500 TABLET ORAL 3 TIMES DAILY
Qty: 84 TABLET | Refills: 0 | Status: SHIPPED | OUTPATIENT
Start: 2024-02-13 | End: 2024-03-12

## 2024-02-13 RX ADMIN — HYDROCODONE BITARTRATE AND ACETAMINOPHEN 1 TABLET: 5; 325 TABLET ORAL at 12:57

## 2024-02-13 RX ADMIN — WATER 1000 MG: 1 INJECTION INTRAMUSCULAR; INTRAVENOUS; SUBCUTANEOUS at 15:32

## 2024-02-13 RX ADMIN — PIPERACILLIN AND TAZOBACTAM 3375 MG: 3; .375 INJECTION, POWDER, FOR SOLUTION INTRAVENOUS at 06:28

## 2024-02-13 RX ADMIN — SODIUM CHLORIDE, PRESERVATIVE FREE 10 ML: 5 INJECTION INTRAVENOUS at 09:18

## 2024-02-13 RX ADMIN — METRONIDAZOLE 500 MG: 500 TABLET ORAL at 15:29

## 2024-02-13 RX ADMIN — HYDROCODONE BITARTRATE AND ACETAMINOPHEN 1.5 TABLET: 5; 325 TABLET ORAL at 03:40

## 2024-02-13 NOTE — PLAN OF CARE
Problem: ABCDS Injury Assessment  Goal: Absence of physical injury  Outcome: Progressing     
  Problem: Discharge Planning  Goal: Discharge to home or other facility with appropriate resources  2/13/2024 1732 by Willow Reed RN  Outcome: Adequate for Discharge  2/13/2024 1623 by Willow Reed RN  Outcome: Progressing  Flowsheets (Taken 2/13/2024 0900)  Discharge to home or other facility with appropriate resources: Identify barriers to discharge with patient and caregiver  2/13/2024 0609 by Usha Cristobal RN  Outcome: Progressing     Problem: Pain  Goal: Verbalizes/displays adequate comfort level or baseline comfort level  2/13/2024 1732 by Willow Reed RN  Outcome: Adequate for Discharge  2/13/2024 1623 by Willow Reed RN  Outcome: Progressing  Flowsheets (Taken 2/13/2024 1015)  Verbalizes/displays adequate comfort level or baseline comfort level:   Encourage patient to monitor pain and request assistance   Assess pain using appropriate pain scale   Administer analgesics based on type and severity of pain and evaluate response   Implement non-pharmacological measures as appropriate and evaluate response  2/13/2024 0609 by Usha Cristobal RN  Outcome: Progressing     Problem: ABCDS Injury Assessment  Goal: Absence of physical injury  2/13/2024 1732 by Willow Reed RN  Outcome: Adequate for Discharge  2/13/2024 1623 by Willow Reed RN  Outcome: Progressing  Flowsheets (Taken 2/13/2024 1623)  Absence of Physical Injury: Implement safety measures based on patient assessment  2/13/2024 0609 by Usha Cristobal RN  Outcome: Progressing     
  Problem: Discharge Planning  Goal: Discharge to home or other facility with appropriate resources  Outcome: Progressing     Problem: Pain  Goal: Verbalizes/displays adequate comfort level or baseline comfort level  Outcome: Progressing     Problem: ABCDS Injury Assessment  Goal: Absence of physical injury  Outcome: Progressing     
  Problem: Discharge Planning  Goal: Discharge to home or other facility with appropriate resources  Outcome: Progressing     Problem: Pain  Goal: Verbalizes/displays adequate comfort level or baseline comfort level  Outcome: Progressing     Problem: ABCDS Injury Assessment  Goal: Absence of physical injury  Outcome: Progressing     
D/w Dr Phillips: liver lesion smaller and has only a small arrea to drain-technically not really feasible.  Suggests repeating imaging in 4-5 days and if not sufficiently reduced, he suggests biopsy under ct at that time    Pedro Damon, DO    
Patient continues to complain of pain to mid right back. NPO for possible liver drainage/drain placement with IR today for liver lesion/abscess. Patient up independently. Morphine and norco for pain. IV zosyn      Problem: Discharge Planning  Goal: Discharge to home or other facility with appropriate resources  2/12/2024 0331 by Melanie Chapman, RN  Outcome: Progressing     Problem: Pain  Goal: Verbalizes/displays adequate comfort level or baseline comfort level  2/12/2024 0331 by Melanie Chapman, RN  Outcome: Progressing  Flowsheets (Taken 2/12/2024 0331)  Verbalizes/displays adequate comfort level or baseline comfort level:   Encourage patient to monitor pain and request assistance   Administer analgesics based on type and severity of pain and evaluate response   Consider cultural and social influences on pain and pain management   Assess pain using appropriate pain scale   Implement non-pharmacological measures as appropriate and evaluate response     Problem: ABCDS Injury Assessment  Goal: Absence of physical injury  2/12/2024 0331 by Melanie Chapman, RN  Outcome: Progressing     
Patient had PICC line placed this shift. Patient expected to discharge home this shift with IV antibiotics.   Problem: Discharge Planning  Goal: Discharge to home or other facility with appropriate resources  2/13/2024 1623 by Willow Reed RN  Outcome: Progressing  Flowsheets (Taken 2/13/2024 0900)  Discharge to home or other facility with appropriate resources: Identify barriers to discharge with patient and caregiver     Problem: Pain  Goal: Verbalizes/displays adequate comfort level or baseline comfort level  2/13/2024 1623 by Willow Reed RN  Outcome: Progressing  Flowsheets (Taken 2/13/2024 1015)  Verbalizes/displays adequate comfort level or baseline comfort level:   Encourage patient to monitor pain and request assistance   Assess pain using appropriate pain scale   Administer analgesics based on type and severity of pain and evaluate response   Implement non-pharmacological measures as appropriate and evaluate response     Problem: ABCDS Injury Assessment  Goal: Absence of physical injury  2/13/2024 1623 by Willow Reed RN  Outcome: Progressing  Flowsheets (Taken 2/13/2024 1623)  Absence of Physical Injury: Implement safety measures based on patient assessment     
Patient is alert and oriented X4. She is up independently. Her pain has been controlled with the PRN morphine and norco. She is awaiting her I.R. Drainage/ biopsy.   Problem: Discharge Planning  Goal: Discharge to home or other facility with appropriate resources  2/12/2024 1540 by Saranya Garcia, RN  Outcome: Progressing  Flowsheets  Taken 2/12/2024 1540  Discharge to home or other facility with appropriate resources:   Identify barriers to discharge with patient and caregiver   Arrange for needed discharge resources and transportation as appropriate   Identify discharge learning needs (meds, wound care, etc)   Refer to discharge planning if patient needs post-hospital services based on physician order or complex needs related to functional status, cognitive ability or social support system  Taken 2/12/2024 0832  Discharge to home or other facility with appropriate resources: Identify barriers to discharge with patient and caregiver     Problem: Pain  Goal: Verbalizes/displays adequate comfort level or baseline comfort level  2/12/2024 1540 by Saranya Garcia RN  Outcome: Progressing  Flowsheets (Taken 2/12/2024 1540)  Verbalizes/displays adequate comfort level or baseline comfort level:   Encourage patient to monitor pain and request assistance   Assess pain using appropriate pain scale   Administer analgesics based on type and severity of pain and evaluate response   Implement non-pharmacological measures as appropriate and evaluate response   Notify Licensed Independent Practitioner if interventions unsuccessful or patient reports new pain     Problem: ABCDS Injury Assessment  Goal: Absence of physical injury  2/12/2024 1540 by Saranya Garcia, RN  Outcome: Progressing  Flowsheets (Taken 2/12/2024 1540)  Absence of Physical Injury: Implement safety measures based on patient assessment     
normal...

## 2024-02-13 NOTE — PROCEDURES
Picc placement note:  Dynamic Access RN procedure    Consent signed and obtained by proceduralist, from catalina. See consent form in paper chart.    Prescribed IV Therapy = DC ABX Rocephin x4 weeks and reevaluate.   Peripheral ultrasound assessment done. Plan for left brachial vein insertion.   CVR measurement = 9 % (Linear CVR is preferred to be less than 45%).  Product type: Bard 4 fr SL lumen Power PICC.  History/Labs/Allergies Reviewed  Placed By: Carolynn Gu - RN (Dynamic Access)  Time out Performed using Two Identifiers  Lot # [id number]  Expiration date = 03-  Trimmed at 42 cm total  External catheter length 0 cm  Number of attempts 1  Special equipment used- Bard 3cg tip confirmation system, ultrasound, and micro-introducer (MST) technique   Catheter securement = adhesive 3M securement device  Dressing applied= Tegaderm CHG  Lidocaine administered intradermally conc.1%, approx 1 ml (Lidocaine Lot# - [id number] and Exp date - 04- )    Willow RN aware picc placed with ECG technology and is confirmed in the distal 1/3 SVC. Picc is immediately released for use. Rn aware new iv tubing required.         PICC education:     [ X ] Discussed with patient/Family or POA prior to procedure.  Risks and Benefits along with reason for procedure were discussed and teaching was reinforced with an education handout on line  insertion. CDC FAQ Catheter Associated Blood Stream Infections and [facility] 135[address] REV. 7/13 Nursing and Booklet left at bedside or in chart. Patient (Family or POA) acknowledged understanding of information taught and agreed to procedure.

## 2024-02-13 NOTE — PROGRESS NOTES
[facility]  Progress Note    Amanda Raphael  MRN: [id number]  Date: 2/13/2024  LOS: 4     Reason for F/U :   Hepatic abscess    Impression :   Right hepatic lobe liver lesion-presumed abscess  Improved by imaging and was technically difficult to aspirate  Recent fevers/diaphoresis  Reported dental infections    Recommendations:   Clinically the patient is improving and the lesion is felt to be decreasing in size and therefore considered to be infectious in nature given the improvement and that the fact that the patient is improving symptomatically  The patient is currently on Zosyn and I will change antibiotic therapy to Rocephin and metronidazole in anticipation for discharge  The plan will be for 6 weeks of intravenous antimicrobial therapy  The patient will need follow-up imaging in the near future    Infection Control Recommendations:   Universal precautions    Discharge Planning:   Estimated Length of IV antimicrobials: 6 weeks  Patient will need Midline Catheter Insertion/ PICC line Insertion: No  Patient will need: Home IV , Infusion Center,  SNF,  LTAC: Undetermined  Patient willneed outpatient wound care: No    Medical Decision making / Summary of Stay:   Amanda Raphael is a 63 y.o.-year-old female who was initially admitted on 2/9/2024.   Amanda does not report any significant past medical history other than some \"dental infections\".  The patient reports that she has had issues with her teeth increasingly over the last 6 months with bleeding gums and dental infections.  She typically never gets sick and even if she does she reports that she has like a 24-hour bug and is better.  She reports that for about 10 to 14 days now she has had fevers, chills, sweats especially at night.  She reports that the symptoms seem to be okay during the day but at night to the will get worse and the symptoms have been going on and off.  She reports a mild headache.  She reports that for the past 7 days she

## 2024-02-13 NOTE — CARE COORDINATION
DC Planning    Pt prefers [facility] for pcp-called office spoke with Neal baird set up with Ann Marie Franco NP for March 5th, 2024 at 9:20-updated pt agrees with date/time.

## 2024-02-13 NOTE — PROGRESS NOTES
[facility]   Medication Review    The patient's list of current home medications has been reviewed.     Source(s) of information: spoke to patient     Based on information provided by the above source(s), I have updated the patient's home med list as described below.       I changed or updated the following medications on the patient's home medication list:  Discontinued predniSONE (DELTASONE) 10 MG tablet     Added Probiotic Product (PROBIOTIC DAILY PO)  Glutathione POWD  Multiple Vitamins-Minerals (THERAPEUTIC MULTIVITAMIN-MINERALS) tablet     Adjusted   None      Other Notes None            Please feel free to call me with any questions about this encounter. Thank you.    This note will be reviewed and co-signed by the [facility] Pharmacist. The pharmacist will review inpatient orders and contact the physician about any discrepancies.    Edgardo Haque, pharmacy technician  [facility]  Phone:  [phone]  Fax: [phone]      Electronically signed by Edgardo Haque on 2/13/2024 at 5:55 PM       Prior to Admission medications    Medication Sig   Multiple Vitamins-Minerals (THERAPEUTIC MULTIVITAMIN-MINERALS) tablet Take 1 tablet by mouth daily   Probiotic Product (PROBIOTIC DAILY PO) Take 1 tablet by mouth daily   Glutathione POWD by Does not apply route 2 times daily

## 2024-02-13 NOTE — CARE COORDINATION
DC Planning      Spoke with ID-pt will need IV abx x 1 month.     Pt had [employer] listed as insurance as of yesterday-then was showing self pay-spoke with pt -she relays she pays for her health insurance out of pocket and received a verification of insurance.     Pt did call insurance company and now showing active [employer] that is active and has been verified.     [facility] did run cost for Rocephin. Pt has a deductible of $7500 likely hospital will eat cost then has %50 coverage until her oop max at $9,400 then covered at 100%. Teresa will call pt.     Updated pt on plan for IV ABX, access has been called for line placement.     Plans have been arranged for pt to go to [facility] on [facility]  tomorrow at 1pm  for education and can get line care and lab draws there weekly.

## 2024-02-13 NOTE — DISCHARGE SUMMARY
[facility]  Office: [phone]  Luis Daniel Magana DO, Ben Watson DO, Rajan Phillips DO, Pedro Damon DO, Bhavin Rajput MD, Denia Villalba MD, Alison Sarmiento MD, Renata Abel MD,  Jim Campbell MD, Sinai Bowers MD, Samanta Vee MD,  Maurice Tovar DO, Sheba Byrd MD, Miguel Rankin MD, Terence Magana DO, Bryanna Perez MD,  Salbador Raines DO, Crystal Krishna MD, Halina Delgado MD, Kelley Harding MD, Sulema Moore MD,  Juan Jose Rivers MD, Hernandez Navarro MD, Amarilys Amin MD, Gene Romero MD, Temo Khalil MD, Viktor Kelly MD, Soy Jim DO, Juan Nazario DO, Makayla Arzate MD,  Romain Gee MD, Shirley Waterhouse, CNP,  Giselle Rockwell, CNP, Brendan Christopher, CNP,  Xiomara Styles, LEVI, Yuliana Holland, CNP, Ginette Boucher, CNP, Olesya Guerra CNP, Johnna Alegre, CNP, Miriam King, CNP, Verona Nieto, PA-C, Maria R Nixon PA-C, July Munoz, CNP, Brandy Jamil, CNP, Britta Wynne, CNS, Virginia aMe, CNP, Malinda Ling, CNP, Tracy Schwab, CNP         [facility]   IN-PATIENT SERVICE   [facility]    Discharge Summary     Patient ID: Amanda Raphael  :  1960   MRN: [id number]     ACCOUNT:  [id number]   Patient's PCP: No primary care provider on file.  Admit Date: 2024   Discharge Date: 2024 ***    Length of Stay: 4  Code Status:  Full Code  Admitting Physician: Pedro Damon DO  Discharge Physician: Rajan Phillips DO     Active Discharge Diagnoses:     Hospital Problem Lists:  Principal Problem:    Liver lesion  Active Problems:    Neutrophilia    Lymphadenopathy    Lesion of right lobe of liver    Pleural effusion    Lesion of liver greater than 1 cm in diameter with no liver disease or history of malignant neoplasm  Resolved Problems:    * No resolved hospital problems. *      Admission Condition:  {condition:13236}     Discharged Condition: {condition:43865}    Hospital Stay:     Hospital Course:  Amanda Raphael is a 63

## 2024-02-13 NOTE — PROGRESS NOTES
SPIRITUAL CARE DEPARTMENT [facility]  PROGRESS NOTE    Room # 2005/2005-01   Name: Amanda Raphael              Reason for visit: Routine    I visited the patient.    Admit Date & Time: 2/9/2024 10:11 AM    Assessment:  Amanda Raphael is a 63 y.o. female.   Jim and Dada Baxter  present. Upon entering the room patient states about their medical condition, states struggles with their medical situation. States worries, fears frustrations.PT: shares much about her past and struggles.  Patient states well , treated well. Patient states good family support, shares about spiritual life, Roman Catholic background, shares Roman Catholic beliefs. Patient shares about outside interests.    Intervention:   provided a ministry presence, support,  listening and prayer.    Outcome:  Patient open to visit.     Plan:  Chaplains will remain available to offer spiritual and emotional support as needed.    Electronically signed by Chaplain Max, on 2/13/2024 at 2:41 PM.  Spiritual Care Department  [facility]      02/13/24 1439   Encounter Summary   Service Provided For: Patient   Referral/Consult From: [facility]   Support System Children;Family members   Last Encounter  02/13/24   Complexity of Encounter High   Begin Time 1254   End Time  0120   Total Time Calculated 746 min   Spiritual/Emotional needs   Type Spiritual Support;Emotional Distress   Grief, Loss, and Adjustments   Type Life Adjustments;Adjustment to illness   Assessment/Intervention/Outcome   Assessment Calm;Coping;Hopeful;Peaceful;Tearful   Intervention Active listening;Discussed belief system/Roman Catholic practices/isaac;Discussed illness injury and it’s impact;Prayer (assurance of)/[place];Sustaining Presence/Ministry of presence;Life review/Legacy   Outcome Engaged in conversation;Expressed feelings, needs, and concerns;Expressed Gratitude;Receptive

## 2024-02-13 NOTE — PROGRESS NOTES
_    Today's Date: 2/13/2024  Patient Name: Amanda Raphael  Date of admission: 2/9/2024 10:11 AM  Patient's age: 63 y.o., 1960  Admission Dx: Pleural effusion [J90]  Hilar lymphadenopathy [R59.0]  Lesion of right lobe of liver [K76.9]  Lesion of liver greater than 1 cm in diameter with no liver disease or history of malignant neoplasm [K76.9]      Requesting Physician: Pedro Damon DO    CHIEF COMPLAINT: Right lower ribs pain.  Right shoulder pain.  Recent fever or chills.  Liver mass.    History Obtained From:  patient, electronic medical record  INTERVAL HISTORY:    Patient seen and examined  Liver biopsy was canceled as ultrasound showed decrease in the size liver lesion and very less central fluid so aspiration was not done  No fever chills  HISTORY OF PRESENT ILLNESS:    The patient is a 63 y.o.  female who is admitted to the hospital for further management of pain and right hepatic lobe lesion.  The patient states she had 1 week of increasing pain in the right side lower rib cage.  Pain was also radiating to the right shoulder.  Symptoms were getting worse.  She had no cough sputum or hemoptysis.  The week before having the symptoms she was having signs of infections with fever and chills.  Currently pain is constant but no other significant symptoms.  No nausea or vomiting.  No GI bleeding.  Patient presented emergency room and she had CT scan of the chest PE protocol.  She had no pulmonary embolism but she had large indeterminate poorly defined right hepatic lobe lesion measuring 7.3 cm there were enlarged subcarinal and right hilar lymph nodes.  She is admitted due to possibility of infection and high white blood cells.  Has no history of smoking.  Social alcohol drinking.    Past Medical History:   has no past medical history on file.    Past Surgical History:   has a past surgical history that includes Tubal ligation.   Family History:

## 2024-02-13 NOTE — DISCHARGE INSTR - COC
Continuity of Care Form    Patient Name: Amanda Raphael   :  1960  MRN:  [id number]    Admit date:  2024  Discharge date:      Code Status Order: Full Code   Advance Directives:     Admitting Physician:  Pedro Damon DO  PCP: No primary care provider on file.    Discharging Nurse: Willow RAMIREZ  Discharging Hospital Unit/Room#:   Discharging Unit Phone Number: [phone]    Emergency Contact:   Extended Emergency Contact Information  Primary Emergency Contact: Melo Raphael  Home Phone: [phone]  Relation: Other    Past Surgical History:  Past Surgical History:   Procedure Laterality Date    TUBAL LIGATION         Immunization History:     There is no immunization history on file for this patient.    Active Problems:  Patient Active Problem List   Diagnosis Code    Liver lesion K76.9    Neutrophilia D72.9    Lymphadenopathy R59.1    Lesion of right lobe of liver K76.9    Pleural effusion J90    Lesion of liver greater than 1 cm in diameter with no liver disease or history of malignant neoplasm K76.9       Isolation/Infection:   Isolation            No Isolation          Patient Infection Status       None to display            Nurse Assessment:  Last Vital Signs: BP (!) 170/97   Pulse 85   Temp 99 °F (37.2 °C) (Oral)   Resp 18   Ht 1.676 m (5' 6\")   Wt 87.1 kg (192 lb)   SpO2 95%   BMI 30.99 kg/m²     Last documented pain score (0-10 scale): Pain Level: 5  Last Weight:   Wt Readings from Last 1 Encounters:   24 87.1 kg (192 lb)     Mental Status:  oriented    IV Access:  - PICC - site  L brachial , insertion date:     Nursing Mobility/ADLs:  Walking   Independent  Transfer  Independent  Bathing  Independent  Dressing  Independent  Toileting  Independent  Feeding  Independent  Med Admin  Independent  Med Delivery   whole    Wound Care Documentation and Therapy:        Elimination:  Continence:   Bowel: Yes  Bladder: Yes  Urinary Catheter: None

## 2024-02-13 NOTE — PROGRESS NOTES
CLINICAL PHARMACY NOTE: MEDS TO BEDS    Total # of Prescriptions Filled: 3   The following medications were delivered to the patient:  Norco 5-325  Metronidazole 500mg  Aspercreme lidocaine 4% patch    Additional Documentation:

## 2024-02-13 NOTE — PROGRESS NOTES
This writer went over AVS with patient and answered all questions at this time. Patient verbalized understanding of all follow up appointments. Patient verbalized understanding of PICC line care. Patient had scripts delivered by meds to bed. Patient leaving via wheelchair with all personal belongings to private transportation with family member at this time. Patient stable at time of discharge.

## 2024-02-14 LAB
MICROORGANISM SPEC CULT: NORMAL
MICROORGANISM SPEC CULT: NORMAL
SERVICE CMNT-IMP: NORMAL
SERVICE CMNT-IMP: NORMAL
SPECIMEN DESCRIPTION: NORMAL
SPECIMEN DESCRIPTION: NORMAL

## 2024-03-05 ENCOUNTER — OFFICE VISIT (OUTPATIENT)
Dept: FAMILY MEDICINE CLINIC | Age: 64
End: 2024-03-05
Payer: COMMERCIAL

## 2024-03-05 VITALS
SYSTOLIC BLOOD PRESSURE: 162 MMHG | HEART RATE: 77 BPM | WEIGHT: 188.2 LBS | TEMPERATURE: 97.3 F | BODY MASS INDEX: 29.54 KG/M2 | DIASTOLIC BLOOD PRESSURE: 104 MMHG | HEIGHT: 67 IN | OXYGEN SATURATION: 97 %

## 2024-03-05 DIAGNOSIS — Z12.31 ENCOUNTER FOR SCREENING MAMMOGRAM FOR MALIGNANT NEOPLASM OF BREAST: ICD-10-CM

## 2024-03-05 DIAGNOSIS — Z09 HOSPITAL DISCHARGE FOLLOW-UP: ICD-10-CM

## 2024-03-05 DIAGNOSIS — R03.0 ELEVATED BP WITHOUT DIAGNOSIS OF HYPERTENSION: ICD-10-CM

## 2024-03-05 DIAGNOSIS — J90 PLEURAL EFFUSION: ICD-10-CM

## 2024-03-05 DIAGNOSIS — Z76.89 ENCOUNTER TO ESTABLISH CARE: Primary | ICD-10-CM

## 2024-03-05 DIAGNOSIS — K76.9 LESION OF LIVER GREATER THAN 1 CM IN DIAMETER WITH NO LIVER DISEASE OR HISTORY OF MALIGNANT NEOPLASM: ICD-10-CM

## 2024-03-05 DIAGNOSIS — Z00.00 WELL ADULT EXAM: ICD-10-CM

## 2024-03-05 DIAGNOSIS — R73.9 HYPERGLYCEMIA: ICD-10-CM

## 2024-03-05 PROCEDURE — 99386 PREV VISIT NEW AGE 40-64: CPT

## 2024-03-05 SDOH — ECONOMIC STABILITY: HOUSING INSECURITY
IN THE LAST 12 MONTHS, WAS THERE A TIME WHEN YOU DID NOT HAVE A STEADY PLACE TO SLEEP OR SLEPT IN A SHELTER (INCLUDING NOW)?: NO

## 2024-03-05 SDOH — ECONOMIC STABILITY: INCOME INSECURITY: HOW HARD IS IT FOR YOU TO PAY FOR THE VERY BASICS LIKE FOOD, HOUSING, MEDICAL CARE, AND HEATING?: NOT HARD AT ALL

## 2024-03-05 SDOH — ECONOMIC STABILITY: FOOD INSECURITY: WITHIN THE PAST 12 MONTHS, YOU WORRIED THAT YOUR FOOD WOULD RUN OUT BEFORE YOU GOT MONEY TO BUY MORE.: NEVER TRUE

## 2024-03-05 SDOH — ECONOMIC STABILITY: FOOD INSECURITY: WITHIN THE PAST 12 MONTHS, THE FOOD YOU BOUGHT JUST DIDN'T LAST AND YOU DIDN'T HAVE MONEY TO GET MORE.: NEVER TRUE

## 2024-03-05 ASSESSMENT — PATIENT HEALTH QUESTIONNAIRE - PHQ9
SUM OF ALL RESPONSES TO PHQ QUESTIONS 1-9: 0
SUM OF ALL RESPONSES TO PHQ QUESTIONS 1-9: 0
2. FEELING DOWN, DEPRESSED OR HOPELESS: 0
SUM OF ALL RESPONSES TO PHQ QUESTIONS 1-9: 0
1. LITTLE INTEREST OR PLEASURE IN DOING THINGS: 0
SUM OF ALL RESPONSES TO PHQ9 QUESTIONS 1 & 2: 0
SUM OF ALL RESPONSES TO PHQ QUESTIONS 1-9: 0

## 2024-03-05 NOTE — PROGRESS NOTES
Amanda Raphael (:  1960) is a 63 y.o. female,New patient, here for evaluation of the following chief complaint(s):  Establish Care          Subjective   SUBJECTIVE/OBJECTIVE:  Pt here today to establish care  BP elevated, monitors intermittently at home    No significant PMH   Pt had a recent hospitalization after she went to ER for right rib and shoulder pain  She spontaneously dislocated her shoulder at home and was able to reduce it  She went to ER for eval d/t ongoing pain     Pt had abnormal findings on CT chest and CT AP of right sided pleural effusion w/ lymphadenopathy along w/ a liver lesion   Malignancy could not be r/o but w/ correlating night sweats, chills and flu like sx along w/ elevated WBC pt was started on abx  Plan for IR eval of possible drainage vs biopsy but when pt went down for procedure the area had reduced greatly in size to where there was no sizeable area to drain  This response to abx prompted suspicion of abscess vs malignancy     She is doing Rocephin infusions independently at home through E PICC  She goes to center once weekly for dressing changes and lab draws  She has 2 more week of infusions, does not have f/u w/ ID     Willing to complete screening labs at next lab draw and agreeable to update HM gaps  Prior to being ill she monitors BP once weekly at home and ranges 120s/80-90s          Review of Systems       Objective   Physical Exam  Vitals and nursing note reviewed.   Constitutional:       General: She is not in acute distress.     Appearance: Normal appearance. She is not ill-appearing, toxic-appearing or diaphoretic.   Neck:      Vascular: No carotid bruit.   Cardiovascular:      Rate and Rhythm: Normal rate and regular rhythm.      Pulses: Normal pulses.      Heart sounds: Normal heart sounds.   Pulmonary:      Effort: Pulmonary effort is normal.      Breath sounds: Normal breath sounds.   Abdominal:      General: Abdomen is flat.      Palpations: Abdomen is

## 2024-03-11 ENCOUNTER — TELEPHONE (OUTPATIENT)
Dept: INFECTIOUS DISEASES | Age: 64
End: 2024-03-11

## 2024-03-11 NOTE — TELEPHONE ENCOUNTER
Caroline from [facility] called stating patient's current blood pressures have been 194/124, manual 186/112.  She states this has been an ongoing issue ever since she has been on the Rocephin. Patient is taking Rocephin end date 3/26.  Please advise. Thank you     Caroline- [facility] [phone]

## 2024-03-15 ENCOUNTER — PATIENT MESSAGE (OUTPATIENT)
Dept: FAMILY MEDICINE CLINIC | Age: 64
End: 2024-03-15

## 2024-03-22 ENCOUNTER — HOSPITAL ENCOUNTER (OUTPATIENT)
Dept: ULTRASOUND IMAGING | Age: 64
Discharge: HOME | End: 2024-03-22
Payer: COMMERCIAL

## 2024-03-22 DIAGNOSIS — K76.9 LESION OF LIVER GREATER THAN 1 CM IN DIAMETER WITH NO LIVER DISEASE OR HISTORY OF MALIGNANT NEOPLASM: ICD-10-CM

## 2024-03-22 PROCEDURE — 76705 ECHO EXAM OF ABDOMEN: CPT

## 2024-04-09 ENCOUNTER — OFFICE VISIT (OUTPATIENT)
Dept: FAMILY MEDICINE CLINIC | Age: 64
End: 2024-04-09
Payer: COMMERCIAL

## 2024-04-09 ENCOUNTER — HOSPITAL ENCOUNTER (OUTPATIENT)
Age: 64
Setting detail: SPECIMEN
Discharge: HOME | End: 2024-04-09

## 2024-04-09 VITALS
TEMPERATURE: 97.3 F | HEART RATE: 79 BPM | DIASTOLIC BLOOD PRESSURE: 104 MMHG | BODY MASS INDEX: 31.77 KG/M2 | OXYGEN SATURATION: 98 % | SYSTOLIC BLOOD PRESSURE: 152 MMHG | WEIGHT: 199.8 LBS

## 2024-04-09 DIAGNOSIS — Z12.4 PAPANICOLAOU SMEAR: ICD-10-CM

## 2024-04-09 DIAGNOSIS — K76.9 LESION OF RIGHT LOBE OF LIVER: ICD-10-CM

## 2024-04-09 DIAGNOSIS — I10 PRIMARY HYPERTENSION: Primary | ICD-10-CM

## 2024-04-09 DIAGNOSIS — Z12.11 COLON CANCER SCREENING: ICD-10-CM

## 2024-04-09 PROCEDURE — 99214 OFFICE O/P EST MOD 30 MIN: CPT

## 2024-04-09 PROCEDURE — 3077F SYST BP >= 140 MM HG: CPT

## 2024-04-09 PROCEDURE — 3080F DIAST BP >= 90 MM HG: CPT

## 2024-04-09 SDOH — ECONOMIC STABILITY: INCOME INSECURITY: HOW HARD IS IT FOR YOU TO PAY FOR THE VERY BASICS LIKE FOOD, HOUSING, MEDICAL CARE, AND HEATING?: NOT HARD AT ALL

## 2024-04-09 SDOH — ECONOMIC STABILITY: FOOD INSECURITY: WITHIN THE PAST 12 MONTHS, THE FOOD YOU BOUGHT JUST DIDN'T LAST AND YOU DIDN'T HAVE MONEY TO GET MORE.: NEVER TRUE

## 2024-04-09 SDOH — ECONOMIC STABILITY: FOOD INSECURITY: WITHIN THE PAST 12 MONTHS, YOU WORRIED THAT YOUR FOOD WOULD RUN OUT BEFORE YOU GOT MONEY TO BUY MORE.: NEVER TRUE

## 2024-04-09 ASSESSMENT — PATIENT HEALTH QUESTIONNAIRE - PHQ9
2. FEELING DOWN, DEPRESSED OR HOPELESS: NOT AT ALL
SUM OF ALL RESPONSES TO PHQ QUESTIONS 1-9: 0
SUM OF ALL RESPONSES TO PHQ9 QUESTIONS 1 & 2: 0
SUM OF ALL RESPONSES TO PHQ QUESTIONS 1-9: 0
1. LITTLE INTEREST OR PLEASURE IN DOING THINGS: NOT AT ALL

## 2024-04-09 NOTE — PROGRESS NOTES
Amanda Raphael (:  1960) is a 63 y.o. female,Established patient, here for evaluation of the following chief complaint(s):  Gynecologic Exam and Hypertension          Subjective   SUBJECTIVE/OBJECTIVE:  Pt here today for PAP and HTN f/u  BP elevated, not monitoring at home    Last PAP over 10 years ago, no acute concerns  She is not sexually active, post-menopausal no bleeding    Since last OV she completed abx and had PICC line removed  Liver US post abx completion shows reduced size of previous lesion which is comforting for ddx of possible CA    She is preferring to make lifestyle changes for BP control vs med management at this point    Reminded to complete ordered labs and SONJA  GI referral placed to update colonoscopy, last scope 9 years ago        Review of Systems       Objective   Physical Exam  Vitals and nursing note reviewed.   Constitutional:       General: She is not in acute distress.     Appearance: Normal appearance. She is not ill-appearing, toxic-appearing or diaphoretic.   Cardiovascular:      Rate and Rhythm: Normal rate and regular rhythm.      Pulses: Normal pulses.      Heart sounds: Normal heart sounds.   Genitourinary:     Exam position: Lithotomy position.      Vagina: Normal. No vaginal discharge, erythema or lesions.      Cervix: No friability or cervical bleeding.      Uterus: Normal.       Adnexa: Right adnexa normal and left adnexa normal.   Neurological:      Mental Status: She is alert.                 ASSESSMENT/PLAN:  1. Primary hypertension  -uncontrolled, pt to make lifestyle changes for the next 3 months  -agreeable to med management if still elevated at f/u    2. Papanicolaou smear  -will send co-testing    -     PAP Smear; Future    3. Lesion of right lobe of liver  -improved on updated US, no f/u required per ID    4. Colon cancer screening  -     Sophy Aquino MD, Gastroenterology, [facility]      Return in about 3 months (around 2024), or if symptoms

## 2024-04-11 ENCOUNTER — TELEPHONE (OUTPATIENT)
Dept: GASTROENTEROLOGY | Age: 64
End: 2024-04-11

## 2024-04-11 NOTE — TELEPHONE ENCOUNTER
Attempt 1, writer left pt voicemail message to call office back to schedule colonoscopy/Aziz    Attempt 2, writer sent pt letter in mail/colonoscopy questionnaire    Colonoscopy (WQ/Screening)  Dx: Colon cancer screening

## 2024-04-22 LAB — CYTOLOGY REPORT: NORMAL

## 2024-08-24 ENCOUNTER — HOSPITAL ENCOUNTER (OUTPATIENT)
Dept: MAMMOGRAPHY | Age: 64
End: 2024-08-24
Payer: COMMERCIAL

## 2024-08-24 DIAGNOSIS — Z12.31 ENCOUNTER FOR SCREENING MAMMOGRAM FOR MALIGNANT NEOPLASM OF BREAST: ICD-10-CM

## 2024-08-24 PROCEDURE — 77063 BREAST TOMOSYNTHESIS BI: CPT

## 2024-10-15 NOTE — PROGRESS NOTES
_    Today's Date: 2/11/2024  Patient Name: Amanda Raphael  Date of admission: 2/9/2024 10:11 AM  Patient's age: 63 y.o., 1960  Admission Dx: Pleural effusion [J90]  Hilar lymphadenopathy [R59.0]  Lesion of right lobe of liver [K76.9]  Lesion of liver greater than 1 cm in diameter with no liver disease or history of malignant neoplasm [K76.9]      Requesting Physician: Pedro Damon DO    CHIEF COMPLAINT: Right lower ribs pain.  Right shoulder pain.  Recent fever or chills.  Liver mass.    History Obtained From:  patient, electronic medical record  INTERVAL HISTORY:    Patient seen and examined  Afebrile this AM  Abd pain+ right side abd upper back  No NV    HISTORY OF PRESENT ILLNESS:    The patient is a 63 y.o.  female who is admitted to the hospital for further management of pain and right hepatic lobe lesion.  The patient states she had 1 week of increasing pain in the right side lower rib cage.  Pain was also radiating to the right shoulder.  Symptoms were getting worse.  She had no cough sputum or hemoptysis.  The week before having the symptoms she was having signs of infections with fever and chills.  Currently pain is constant but no other significant symptoms.  No nausea or vomiting.  No GI bleeding.  Patient presented emergency room and she had CT scan of the chest PE protocol.  She had no pulmonary embolism but she had large indeterminate poorly defined right hepatic lobe lesion measuring 7.3 cm there were enlarged subcarinal and right hilar lymph nodes.  She is admitted due to possibility of infection and high white blood cells.  Has no history of smoking.  Social alcohol drinking.    Past Medical History:   has no past medical history on file.    Past Surgical History:   has a past surgical history that includes Tubal ligation.   Family History: family history includes Cancer in her brother.  Social History:   reports that she has quit  Alejandro to communicate with maira in [place].

## 2024-10-24 ENCOUNTER — TELEPHONE (OUTPATIENT)
Dept: GASTROENTEROLOGY | Age: 64
End: 2024-10-24

## 2024-10-24 NOTE — TELEPHONE ENCOUNTER
Writer spoke with pt to cancel procedure. Please see original procedure scheduling telephone encounter with Dr. Caldwell on 04/11/24.

## 2024-10-24 NOTE — TELEPHONE ENCOUNTER
Patient states she needs to cancel colonoscopy with DR. Caldwell, 11/06/2024, due to family emergency,  will call back to r/s , please call to confirm cancellation [phone] [facility]/sc

## 2025-03-02 ENCOUNTER — HOSPITAL ENCOUNTER (EMERGENCY)
Age: 65
Discharge: HOME | End: 2025-03-02
Attending: EMERGENCY MEDICINE
Payer: COMMERCIAL

## 2025-03-02 VITALS
SYSTOLIC BLOOD PRESSURE: 185 MMHG | TEMPERATURE: 97.9 F | WEIGHT: 199 LBS | BODY MASS INDEX: 31.64 KG/M2 | HEART RATE: 94 BPM | OXYGEN SATURATION: 94 % | DIASTOLIC BLOOD PRESSURE: 95 MMHG | RESPIRATION RATE: 15 BRPM

## 2025-03-02 DIAGNOSIS — R19.7 NAUSEA VOMITING AND DIARRHEA: Primary | ICD-10-CM

## 2025-03-02 DIAGNOSIS — R11.2 NAUSEA VOMITING AND DIARRHEA: Primary | ICD-10-CM

## 2025-03-02 DIAGNOSIS — I10 ESSENTIAL HYPERTENSION: ICD-10-CM

## 2025-03-02 PROCEDURE — 96376 TX/PRO/DX INJ SAME DRUG ADON: CPT

## 2025-03-02 PROCEDURE — 2580000003 HC RX 258: Performed by: EMERGENCY MEDICINE

## 2025-03-02 PROCEDURE — 99284 EMERGENCY DEPT VISIT MOD MDM: CPT

## 2025-03-02 PROCEDURE — 96375 TX/PRO/DX INJ NEW DRUG ADDON: CPT

## 2025-03-02 PROCEDURE — 96374 THER/PROPH/DIAG INJ IV PUSH: CPT

## 2025-03-02 PROCEDURE — 6370000000 HC RX 637 (ALT 250 FOR IP): Performed by: EMERGENCY MEDICINE

## 2025-03-02 PROCEDURE — 6360000002 HC RX W HCPCS: Performed by: EMERGENCY MEDICINE

## 2025-03-02 PROCEDURE — 2500000003 HC RX 250 WO HCPCS: Performed by: EMERGENCY MEDICINE

## 2025-03-02 RX ORDER — ONDANSETRON 2 MG/ML
4 INJECTION INTRAMUSCULAR; INTRAVENOUS ONCE
Status: COMPLETED | OUTPATIENT
Start: 2025-03-02 | End: 2025-03-02

## 2025-03-02 RX ORDER — 0.9 % SODIUM CHLORIDE 0.9 %
1000 INTRAVENOUS SOLUTION INTRAVENOUS ONCE
Status: COMPLETED | OUTPATIENT
Start: 2025-03-02 | End: 2025-03-02

## 2025-03-02 RX ORDER — AMLODIPINE BESYLATE 5 MG/1
5 TABLET ORAL ONCE
Status: COMPLETED | OUTPATIENT
Start: 2025-03-02 | End: 2025-03-02

## 2025-03-02 RX ORDER — LABETALOL HYDROCHLORIDE 5 MG/ML
10 INJECTION, SOLUTION INTRAVENOUS ONCE
Status: DISCONTINUED | OUTPATIENT
Start: 2025-03-02 | End: 2025-03-02

## 2025-03-02 RX ADMIN — ONDANSETRON 4 MG: 2 INJECTION, SOLUTION INTRAMUSCULAR; INTRAVENOUS at 21:18

## 2025-03-02 RX ADMIN — AMLODIPINE BESYLATE 5 MG: 5 TABLET ORAL at 22:56

## 2025-03-02 RX ADMIN — SODIUM CHLORIDE 1000 ML: 0.9 INJECTION, SOLUTION INTRAVENOUS at 21:16

## 2025-03-02 RX ADMIN — ONDANSETRON 4 MG: 2 INJECTION, SOLUTION INTRAMUSCULAR; INTRAVENOUS at 22:56

## 2025-03-02 RX ADMIN — FAMOTIDINE 20 MG: 10 INJECTION, SOLUTION INTRAVENOUS at 21:18

## 2025-03-03 NOTE — DISCHARGE INSTRUCTIONS
Return to this emergency room immediately if your symptoms persist, worsen or if new ones form.    Make sure you follow-up with your primary care doctor within the next 1-2 business days and discuss starting treatment for your high blood pressure.

## 2025-03-03 NOTE — ED NOTES
Patient reports symptoms of nausea/vomiting have improved with medications and IV fluids. Patient denies further needs at this time.

## 2025-03-03 NOTE — ED PROVIDER NOTES
EMERGENCY DEPARTMENT ENCOUNTER    Pt Name: Amanda Raphael  MRN: [id number]  Birthdate 1960  Date of evaluation: 3/2/25  CHIEF COMPLAINT       Chief Complaint   Patient presents with    Abdominal Pain     Px arrives complaining of abd pain present w N/V/D that initiated this evening. Px states that she is not prescribed BP meds but also notes she is feeling lightheaded    Vomiting     HISTORY OF PRESENT ILLNESS   The history is provided by the patient and medical records.    The patient is a 64-year-old who presents to the ED for nausea and vomiting and diarrhea.  Symptoms started earlier today.  Denies fevers. No reports of hematemesis.  No reports of melena or bright red blood in stool.    REVIEW OF SYSTEMS     Review of Systems  All other systems reviewed and are negative.    PASTMEDICAL HISTORY   History reviewed. No pertinent past medical history.  Past Problem List  Patient Active Problem List   Diagnosis Code    Liver lesion K76.9    Neutrophilia D72.828    Lymphadenopathy R59.1    Lesion of right lobe of liver K76.9    Pleural effusion J90    Lesion of liver greater than 1 cm in diameter with no liver disease or history of malignant neoplasm K76.9    Primary hypertension I10     SURGICAL HISTORY       Past Surgical History:   Procedure Laterality Date    TUBAL LIGATION       CURRENT MEDICATIONS       Previous Medications    GLUTATHIONE POWD    by Does not apply route 2 times daily    LIDOCAINE 4 % EXTERNAL PATCH    Place 1 patch onto the skin daily    MULTIPLE VITAMINS-MINERALS (THERAPEUTIC MULTIVITAMIN-MINERALS) TABLET    Take 1 tablet by mouth daily    PROBIOTIC PRODUCT (PROBIOTIC DAILY PO)    Take 1 tablet by mouth daily     ALLERGIES     is allergic to codeine.  FAMILY HISTORY     She indicated that her mother is alive. She indicated that her father is alive. She indicated that her brother is .     SOCIAL HISTORY       Social History     Tobacco Use    Smoking status: Never    Smokeless tobacco: